# Patient Record
Sex: FEMALE | Race: BLACK OR AFRICAN AMERICAN
[De-identification: names, ages, dates, MRNs, and addresses within clinical notes are randomized per-mention and may not be internally consistent; named-entity substitution may affect disease eponyms.]

---

## 2017-07-19 NOTE — ER DOCUMENT REPORT
ED Skin Rash/Insect Bite/Abscs





- General


Chief Complaint: Abscess


Stated Complaint: ABSCESS ON BACK OF RIGHT HAND


Time Seen by Provider: 07/18/17 22:59


Mode of Arrival: Ambulatory


Information source: Patient


Notes: 


32-year-old female presents to ED for abscess to the left hand for 3-4 days.


TRAVEL OUTSIDE OF THE [place] IN LAST 30 DAYS: No


COUNTRY TRAVELED TO/FROM: [facility]


Patient complains to provider of: Tender/swollen area


Onset: Other - 3 To 4 days


Onset/Duration: Gradual


Quality of pain: Sharp, Stabbing


Severity: Moderate


Pain Level: 4


Skin Character: Abscess


Quality of rash: Painful


Identify cause: No


Exacerbated by: Movement


Relieved by: Denies


Similar symptoms previously: Yes


Recently seen / treated by doctor: No





- Related Data


Allergies/Adverse Reactions: 


 





Shellfish * [Shellfish] Allergy (Severe, Verified 11/24/16 12:56)


 Anaphylaxis


iodine [Iodine] Allergy (Unknown, Verified 11/24/16 12:56)


 Anaphylaxis











Past Medical History





- General


Information source: Patient





- Social History


Smoking Status: Current Every Day Smoker


Cigarette use (# per day): Yes - pack Per day


Chew tobacco use (# tins/day): No


Smoking Education Provided: Yes - Less than 2 minute


Frequency of alcohol use: None


Drug Abuse: None


Occupation: Health CNA


Lives with: Spouse/Significant other


Family History: Arthritis, CAD, DM, Hyperlipidemia, Hypertension, Malignancy, 

Thyroid Disfunction


Patient has suicidal ideation: No


Patient has homicidal ideation: No





- Past Medical History


Cardiac Medical History: Reports: Hx Hypertension


Pulmonary Medical History: Reports: Hx Asthma - Remote history, Hx COPD, Hx 

Pneumonia


Neurological Medical History: Reports: None


Endocrine Medical History: Reports: Hx Hypothyroidism


Renal/ Medical History: Reports: None


Malignancy Medical History: Reports: Hx Cervical Cancer, Hx Ovarian Cancer


GI Medical History: Reports: Hx Gastroesophageal Reflux Disease, Hx Irritable 

Bowel, Hx Colonoscopy, Hx Endoscopy


Musculoskeltal Medical History: Reports Hx Arthritis - CERVICAL/BACK DDD, 

Reports Hx Fibromyalgia, Reports Hx Musculoskeletal Deformity, Reports Hx 

Musculoskeletal Trauma


Skin Medical History: Reports Hx Cellulitis, Reports Hx MRSA


Psychiatric Medical History: Reports: Hx Anxiety, Hx Bipolar Disorder, Hx 

Depression, Hx Obsessive Compulsive Disorder


Traumatic Medical History: Reports: Hx Fractures - Foot nose ribs


Infectious Medical History: Reports: Hx MRSA


Past Surgical History: Reports: Hx Appendectomy, Hx Bowel Surgery - RESECTION, 

Hx Hysterectomy, Hx Orthopedic Surgery - Surgery on left foot 2, Hx Tubal 

Ligation





- Immunizations


Immunizations up to date: Yes


Hx Diphtheria, Pertussis, Tetanus Vaccination: No - THINKS SHE HAD IN 2006.





Review of Systems





- Review of Systems


Constitutional: No symptoms reported


EENT: No symptoms reported


Cardiovascular: No symptoms reported


Respiratory: No symptoms reported


Gastrointestinal: No symptoms reported


Genitourinary: No symptoms reported


Female Genitourinary: No symptoms reported


Musculoskeletal: No symptoms reported


Skin: Other - Left hand


Hematologic/Lymphatic: No symptoms reported


Neurological/Psychological: No symptoms reported





Physical Exam





- Vital signs


Vitals: 


 











Temp Pulse Resp BP Pulse Ox


 


 98.2 F   88   18   127/90 H  94 


 


 07/18/17 21:11  07/18/17 21:11  07/18/17 21:11  07/18/17 21:11  07/18/17 21:11











Interpretation: Normal





- General


General appearance: Appears well, Alert





- HEENT


Head: Normocephalic, Atraumatic


Eyes: Normal


Pupils: PERRL





- Respiratory


Respiratory status: No respiratory distress


Chest status: Nontender


Breath sounds: Normal


Chest palpation: Normal





- Cardiovascular


Rhythm: Regular


Heart sounds: Normal auscultation


Murmur: No





- Abdominal


Inspection: Normal


Distension: No distension


Bowel sounds: Normal


Tenderness: Nontender


Organomegaly: No organomegaly





- Back


Back: Normal, Nontender





- Extremities


General upper extremity: Normal inspection, Nontender, Normal color, Normal ROM

, Normal temperature


General lower extremity: Normal inspection, Nontender, Normal color, Normal ROM

, Normal temperature, Normal weight bearing.  No: Jaja's sign





- Neurological


Neuro grossly intact: Yes


Cognition: Normal


Orientation: AAOx4


[place] Coma Scale Eye Opening: Spontaneous


[place] Coma Scale Verbal: Oriented


[place] Coma Scale Motor: Obeys Commands


[place] Coma Scale Total: 15


Speech: Normal


Motor strength normal: LUE, RUE, LLE, RLE


Sensory: Normal





- Psychological


Associated symptoms: Normal affect, Normal mood





- Skin


Skin Temperature: Warm


Skin Moisture: Dry


Skin Color: Normal


Skin irregularity: Abscess - Hand left


Irregularity with: Swelling, Tenderness, Warmth





Course





- Vital Signs


Vital signs: 


 











Temp Pulse Resp BP Pulse Ox


 


 97.4 F   79   16   112/74   99 


 


 07/19/17 01:19  07/19/17 01:19  07/19/17 01:19  07/19/17 01:19  07/19/17 01:19














Procedures





- Incision and Drainage


  ** Left Hand


Type: Simple


Anesthetic type: 1% Lidocaine


mL's of anesthetic: 6


Blade size: 11


I&D procedure: Sterile dressing applied, Other - Equal scrub and saline


Incision Method: Incision made by scalpel


Amount/type of drainage: Large amount of purulent drainage





Discharge





- Discharge


Clinical Impression: 


 Abscess of right hand





Condition: Stable


Disposition: HOME, SELF-CARE


Additional Instructions: 


ABSCESS:





     You have an abscess (boil).  This a pus-forming infection, usually due to 

staph.  Some boils may be left to drain on their own, but most require lancing.


     From the time the tender lump first appears, it may be three or four days 

before the abscess is ready to bebeto.  Local heat and rest help at this stage 

of treatment.  An antibiotic may prevent spread of the infection.


     Once the abscess is opened, packing may be placed into it.  This is done 

so pus is not sealed inside by premature closure of the cavity. The packing 

will be removed at your follow-up visit or you may be advised to remove it 

yourself at home.  Sometimes this packing must be replaced a few times during 

healing.


     The wound will heal with surprisingly little scar.  Depending on the size 

and location of an abscess, healing can take one to four weeks.


     You may shower and wash the area around the incision site two or three 

times a day.


     Antibiotics may be prescribed, but are usually not necessary after an 

abscess has been drained.


     If you develop fever, chills, worsening pain, or increasing swelling in 

the area, call the doctor or return immediately.








POST INCISION AND DRAINAGE:


     You have had an incision made to allow drainage of an abscess. The 

incision must remain open so that pus and debris can drain from the wound.


     If the abscess cavity is large, packing is placed.  This keeps the tissues 

from collapsing and trapping pus inside, while the body shrinks the cavity.  

The packing may need to be replaced every day or two.  The physician will 

instruct you on the packing.


     Keep a bulky dressing over the area.  Replace it if it becomes saturated 

with blood or pus.  Do not disturb the packing (if present).


     You may shower and cleanse the area with gentle soap and warm water two or 

three times a day.  Local warmth may be soothing, and may promote faster 

healing.


     Return if you develop high fever or chills, or if you note spreading 

redness, increasing swelling, or increasing tenderness.





Epsom Salt Soaks





     Soak the wound area in a container of warm epsom salt water.  If you can't 

get the wound area into a bucket or pan, use a folded towel soaked in the epsom 

salt solution and apply to the area.


     Use clean hot tap water (about the temperature of a very warm bath), 

mixing in about one (1) teaspoon for every pint of water.


           Two gallon --> 16 teaspoons Epsom Salts


           One gallon -->  8 teaspoons Epsom Salts


           Two quarts -->  4 teaspoons Epsom Salts


           One quart  -->  2 teaspoons Epsom Salts


     Soak the wound for about 20 minutes while gently moving it around in the 

water.  Repeat this four (4) times a day.





ORAL NARCOTIC MEDICATION:


     You have been given a norco disp pack for pain control.  This medication 

is a narcotic.  It's best taken with food, as nausea can result if taken on an 

empty stomach.


     Don't operate machinery or drive within six hours of taking this 

medication.  Do not combine this medicine with alcohol, or with any medication 

which can cause sedation (such as cold tablets or sleeping pills) unless you 

get permission from the physician.


     Narcotics tend to cause constipation.  If possible, drink plenty of fluids 

and eat a diet high in fiber and fruits.








CEPHALEXIN:


     The antibiotic you've been prescribed is a member of the cephalosporin 

class.  This type of antibiotic covers a wide variety of infections, including 

those of the skin, lungs, and urinary tract. It's useful for staph infections.


     This antibiotic is slightly similar to the penicillin family. In rare cases

, a person who is allergic to penicillin will also be allergic to this 

medication.  If you have had a severe allergic reaction to penicillin, and have 

not taken this antibiotic since that time, notify your doctor.


     Antibiotics which cover many germs ("broad spectrum" antibiotics) are more 

likely to cause diarrhea or "yeast" infections.  Women prone to vaginal yeast 

problems may suffer an attack after taking this antibiotic.  In infants, oral 

thrush (white spots "stuck" on the cheek) or yeast diaper rash may result.  See 

your doctor if these problems occur.  Call at once if you develop itching, hives

, shortness of breath, or lightheadedness.








TRIMETHOPRIM-SULFA:


     You have been given a prescription for trimethoprim-sulfa (TMS, Septra, 

Bactrim).  This is a combination antibiotic of the sulfa class, often used for 

urinary tract infections, middle ear infections, bronchitis, shigella 

intestinal infection, and Pneumocystis pneumonia.


     TMS is usually well-tolerated.  Occasional side effects include nausea and 

decreased appetite.  Septra is not recommended for infants less than two months 

of age.  Do not take this medication if you have experienced severe side 

effects or allergy to sulfa medicine.


     You should stop this medicine at once and contact your physician if you 

develop any rash, joint pain, shortness of breath, bruising, or jaundice (

yellow color in the skin), or if you develop any other new or unusual symptoms.





FOLLOW-UP CARE:


Most simple abscesses will not require a follow up visit.   If you had packing 

placed in the abscess, remove it as instructed by the physician.  If you have 

been referred to a physician for follow-up care, call the physicians office 

for an appointment as you were instructed or within the next two days.  If you 

experience worsening or a significant change in your symptoms, return to the 

Emergency Department at any time for re-evaluation.





Prescriptions: 


Cephalexin Monohydrate [Keflex 500 mg Capsule] 500 mg PO QID #20 capsule


Sulfamethoxazole/Trimethoprim [Septra-Ds 800-160 mg Tablet] 1 tab PO BID #20 

tablet


Forms:  Elevated Blood Pressure, Smoking Cessation Education, Return to Work


Referrals: 


DEWEY CLEMENTE MD [Primary Care Provider] - Follow up in 3-5 days

## 2018-02-24 NOTE — RADIOLOGY REPORT (SQ)
EXAM DESCRIPTION:  SHOULDER RIGHT 2 OR MORE VIEWS



COMPLETED DATE/TIME:  2/24/2018 11:21 am



REASON FOR STUDY:  R shoulder pain



COMPARISON:  12/29/2015



NUMBER OF VIEWS:  Three views.



TECHNIQUE:  Internal rotation, external rotation, and Y view images acquired of the right shoulder.



LIMITATIONS:  None.



FINDINGS:  MINERALIZATION: Normal.

BONES: There is a lucency in the inferior aspect of the glenoid that was not definitively seen on nic
or exam.  This may represent sequela from old injury or possibly a new injury.

JOINTS: No dislocation.

VISUALIZED LUNGS AND RIBS: No pneumothorax.  No rib fracture.

SOFT TISSUES: No radiopaque foreign body.

OTHER: No other significant finding.



IMPRESSION:  Lucency along the inferior aspect of the glenoid.  This may be sequela from prior injury
, however an knees bony Bankart lesion is not entirely excluded.



TECHNICAL DOCUMENTATION:  JOB ID:  [id number]

 2011 [employer]- All Rights Reserved



Reading location - IP/workstation name: [email or web address]

## 2018-02-24 NOTE — ER DOCUMENT REPORT
ED Medical Screen (RME)





- General


Chief Complaint: Arm Pain


Stated Complaint: ARM PAIN, POSSIBLE ABSCESS


Time Seen by Provider: 02/24/18 10:15


Mode of Arrival: Ambulatory


Information source: Patient


TRAVEL OUTSIDE OF THE [place] IN LAST 30 DAYS: No


COUNTRY TRAVELED TO/FROM: [facility]


Patient complains to provider of: R arm pain


Onset: Other - Pt. noticed red, swollen area on lateral aspect of her R 

shoulder area yesterday.  Denies fever





- Related Data


Allergies/Adverse Reactions: 


 





Shellfish * [Shellfish] Allergy (Severe, Verified 11/24/16 12:56)


 Anaphylaxis


iodine [Iodine] Allergy (Unknown, Verified 11/24/16 12:56)


 Anaphylaxis











Past Medical History





- Past Medical History


Cardiac Medical History: Reports: Hx Hypertension


   Denies: Hx Coronary Artery Disease, Hx Heart Attack


Pulmonary Medical History: Reports: Hx Asthma - Remote history, Hx COPD, Hx 

Pneumonia


   Denies: Hx Bronchitis


Neurological Medical History: Denies: Hx Cerebrovascular Accident, Hx Seizures


Endocrine Medical History: Reports: Hx Hypothyroidism.  Denies: Hx Graves' 

Disease, Hx Hyperthyroidism


Renal/ Medical History: Denies: Hx Peritoneal Dialysis


Malignancy Medical History: Reports: Hx Cervical Cancer, Hx Ovarian Cancer


GI Medical History: Reports: Hx Gastroesophageal Reflux Disease, Hx Irritable 

Bowel, Hx Colonoscopy, Hx Endoscopy.  Denies: Hx Diverticulitis - Diverticulosis


Musculoskeltal Medical History: Reports Hx Arthritis - CERVICAL/BACK DDD, 

Reports Hx Fibromyalgia, Reports Hx Musculoskeletal Deformity, Reports Hx 

Musculoskeletal Trauma


Skin Medical History: Reports Hx Cellulitis, Reports Hx MRSA


Psychiatric Medical History: Reports: Hx Anxiety, Hx Bipolar Disorder, Hx 

Depression, Hx Obsessive Compulsive Disorder


Traumatic Medical History: Reports: Hx Fractures - Foot nose ribs


Infectious Medical History: Reports: Hx MRSA


Past Surgical History: Reports: Hx Appendectomy, Hx Bowel Surgery - RESECTION, 

Hx Hysterectomy, Hx Orthopedic Surgery - Surgery on left foot 2, Hx Tubal 

Ligation





- Immunizations


Immunizations up to date: Yes


Hx Diphtheria, Pertussis, Tetanus Vaccination: No - THINKS SHE HAD IN 2006.





Physical Exam





- Vital signs


Vitals: 





 











Temp Pulse Resp BP Pulse Ox


 


 98.3 F   92   16   128/74 H  99 


 


 02/24/18 10:11  02/24/18 10:11  02/24/18 10:11  02/24/18 10:11  02/24/18 10:11














Course





- Vital Signs


Vital signs: 





 











Temp Pulse Resp BP Pulse Ox


 


 98.3 F   92   16   128/74 H  99 


 


 02/24/18 10:11  02/24/18 10:11  02/24/18 10:11  02/24/18 10:11  02/24/18 10:11
ED Skin Rash/Insect Bite/Abscs





- General


Chief Complaint: Arm Pain


Stated Complaint: ARM PAIN, POSSIBLE ABSCESS


Time Seen by Provider: 02/24/18 10:15


Mode of Arrival: Ambulatory


Information source: Patient


Notes: 





Patient is a 52-year-old female who presents today for a "spider bite" to her 

right lateral shoulder starting around 4 days ago.  She denies any fevers.  She 

does states she vomited 2 2 days ago but has not vomited since that time.  

Patient states she has had an abscess 2 in the past.  She denies being 

diabetic.  She denies any IV drug use.





On further discussion with the patient, she states she has had seizures for the 

last 2 years.  She states she was started on a medicine in [place].  She 

believes it was Seroquel.  She was also given a diagnosis recently of bipolar 

disorder.  Patient states she takes no medications.  Patient states she has a 

seizure-like episode around 1 time per month.  Family in the room have 

witnessed these episodes and it appears that the patient does have some tonic-

clonic jerking.  Patient does have a primary care physician that she states she 

has seen around 8 months ago but did not mention to him the seizure-like 

episodes.  Patient currently denies any headache, chest pain, weakness or 

numbness.  Patient understands that she should not drive a car.  Patient denies 

any auditory or visual hallucinations, racing thoughts, or difficulty sleeping.


TRAVEL OUTSIDE OF THE [place] IN LAST 30 DAYS: No


COUNTRY TRAVELED TO/FROM: [facility]


Patient complains to provider of: Tender/swollen area


Onset: Other - See above


Onset/Duration: Gradual


Quality of pain: Achy


Severity: Mild


Pain Level: 2


Skin Character: Abscess


Skin Temperature: Warm


Quality of rash: Painful


Identify cause: No


Exacerbated by: Movement


Relieved by: Denies


Similar symptoms previously: Yes


Recently seen / treated by doctor: No





- Related Data


Allergies/Adverse Reactions: 


 





Shellfish * [Shellfish] Allergy (Severe, Verified 02/24/18 10:17)


 Anaphylaxis


iodine [Iodine] Allergy (Unknown, Verified 02/24/18 10:17)


 Anaphylaxis











Past Medical History





- General


Information source: Patient





- Social History


Smoking Status: Current Every Day Smoker


Cigarette use (# per day): No


Chew tobacco use (# tins/day): No


Smoking Education Provided: No


Frequency of alcohol use: None


Family History: Arthritis, CAD, DM, Hyperlipidemia, Hypertension, Malignancy, 

Thyroid Disfunction


Patient has suicidal ideation: No


Patient has homicidal ideation: No





- Past Medical History


Cardiac Medical History: Reports: Hx Hypertension


   Denies: Hx Coronary Artery Disease, Hx Heart Attack


Pulmonary Medical History: Reports: Hx Asthma - Remote history, Hx COPD, Hx 

Pneumonia


   Denies: Hx Bronchitis


Neurological Medical History: Denies: Hx Cerebrovascular Accident, Hx Seizures


Endocrine Medical History: Reports: Hx Hypothyroidism.  Denies: Hx Graves' 

Disease, Hx Hyperthyroidism


Renal/ Medical History: Denies: Hx Peritoneal Dialysis


Malignancy Medical History: Reports: Hx Cervical Cancer, Hx Ovarian Cancer


GI Medical History: Reports: Hx Gastroesophageal Reflux Disease, Hx Irritable 

Bowel, Hx Colonoscopy, Hx Endoscopy.  Denies: Hx Diverticulitis - Diverticulosis


Musculoskeltal Medical History: Reports Hx Arthritis - CERVICAL/BACK DDD, 

Reports Hx Fibromyalgia, Reports Hx Musculoskeletal Deformity, Reports Hx 

Musculoskeletal Trauma


Skin Medical History: Reports Hx Cellulitis, Reports Hx MRSA


Psychiatric Medical History: Reports: Hx Anxiety, Hx Bipolar Disorder, Hx 

Depression, Hx Obsessive Compulsive Disorder


Traumatic Medical History: Reports: Hx Fractures - Foot nose ribs


Infectious Medical History: Reports: Hx MRSA


Past Surgical History: Reports: Hx Appendectomy, Hx Bowel Surgery - RESECTION, 

Hx Hysterectomy, Hx Orthopedic Surgery - Surgery on left foot 2, Hx Tubal 

Ligation





- Immunizations


Immunizations up to date: Yes


Hx Diphtheria, Pertussis, Tetanus Vaccination: No - THINKS SHE HAD IN 2006.





Review of Systems





- Review of Systems


Constitutional: denies: Fever


EENT: denies: Eye discharge, Nose discharge


Cardiovascular: denies: Chest pain, Palpitations


Respiratory: denies: Short of breath


Gastrointestinal: Vomiting


Genitourinary: denies: Dysuria


Musculoskeletal: denies: Joint swelling, Muscle pain


Neurological/Psychological: Other - no slurred speech


-: Yes All other systems reviewed and negative





Physical Exam





- Vital signs


Vitals: 


 











Temp Pulse Resp BP Pulse Ox


 


 98.3 F   92   16   128/74 H  99 


 


 02/24/18 10:11  02/24/18 10:11  02/24/18 10:11  02/24/18 10:11  02/24/18 10:11











Notes: 





Reviewed vital signs and nursing note as charted by RN.





CONSTITUTIONAL: Alert and oriented and responds appropriately to questions. Well

-appearing; well-nourished





HEAD: Normocephalic; atraumatic





EYES: PERRL; Sclerae non-icteric





ENT: Normal nose; no rhinorrhea; moist mucous membranes





NECK: Supple without meningismus; non-tender





CARD: Regular rate and rhythm; no murmurs





RESP: Normal chest excursion without splinting or tachypnea; breath sounds 

clear and equal bilaterally





ABD/GI: Normal bowel sounds; non-distended; soft, non-tender





BACK: The back appears normal and is non-tender to palpation





EXT: Patient has some tenderness, erythema, and fluctuance to the lateral right 

deltoid.  It does not appear to extend anteriorly or posteriorly.  Full passive 

range of motion.  Neurovascularly intact distally





SKIN: See above





NEURO: See above





PSYCH: The patient's mood and manner are appropriate. Grooming and personal 

hygiene are appropriate.





Course





- Re-evaluation


Re-evalutation: 





02/24/18 12:02


Given the history and physical examination, we will x-ray the right shoulder, 

obtain basic labs, liver panel, and most likely set up for an I&D.  I do not 

believe that the infection is intra-articular at this time.  Given that the 

patient may be placed on seizure medications in the near future, we will assess 

the liver panel.  Given what appears to be an abscess with some surrounding 

cellulitis, I will  provide both Keflex and Bactrim.





02/24/18 12:17


X-ray as recorded.  White count is recorded.  I spoke to the radiologist read 

the x-ray of the shoulder and explained the full history and physical.  He said 

there is no lucency or remodeling consistent with an osteomyelitis.  I will 

proceed to an incision and drainage.





02/24/18 12:50


Labs as recorded.  I performed a bedside incision and drainage and have packed 

with 1/4 inch iodoform packing.  Antibiotics have been provided.  Patient has 

been encouraged to follow-up with the primary care physician as well as 

possibly the orthopedic surgeon for reassessment.  Strict return precautions 

have been explained.  Patient also has been notified not to drive a car, take a 

bath, go swimming, or engage in any other activities that may cause serious 

harm or death if she should have a repeat seizure-like episode.





- Vital Signs


Vital signs: 


 











Temp Pulse Resp BP Pulse Ox


 


 98.3 F   92   16   128/74 H  99 


 


 02/24/18 10:11  02/24/18 10:11  02/24/18 10:11  02/24/18 10:11  02/24/18 10:11














- Laboratory


Result Diagrams: 


 02/24/18 10:45





 02/24/18 10:45


Laboratory results interpreted by me: 


 











  02/24/18 02/24/18





  10:45 10:45


 


WBC  17.1 H 


 


MCH  25.7 L 


 


RDW  14.3 H 


 


Seg Neutrophils %  80.8 H 


 


Lymphocytes %  12.9 L 


 


Absolute Neutrophils  13.8 H 


 


Glucose   118 H














Procedures





- Incision and Drainage


  ** Right Shoulder


Type: Simple, Single


Anesthetic type: 1% Lidocaine w/epi


mL's of anesthetic: 8


Blade size: 11


I&D procedure: Chlorprep applied, Iodoform packing placed


Incision Method: Incision made by scalpel


Amount/type of drainage: 7ml





Discharge





- Discharge


Clinical Impression: 


 Abscess of right shoulder, Seizure-like activity





Condition: Good


Disposition: HOME, SELF-CARE


Instructions:  Oral Narcotic Medication ([facility]), Abscess ([facility]), Cephalexin ([facility]), 

MRSA Cellulitis ([facility]), Post Incision and Drainage, Trimethoprim-Sulfa ([facility])


Additional Instructions: 


Come back immediately for any increased redness, swelling, pain, fever, 

weakness or numbness, or any other acute problems.  Please follow-up within 24 

hours with either your primary care physician, here in the emergency department

, or with the orthopedic doctor that we have referred you to for reassessment 

of your wound.  





Please do not to drive a car, take a bath, go swimming, or engage in any other 

activities that may cause serious harm or death if you should have a repeat 

seizure-like episode and please follow up with your primary care physician.


Prescriptions: 


Cephalexin Monohydrate [Keflex 500 mg Capsule] 500 mg PO TID 5 Days #30 capsule


Hydrocodone/Acetaminophen [Norco 5-325 Tablet] 1 each PO Q6 PRN #12 tablet


 PRN Reason: For Pain


Sulfamethoxazole/Trimethoprim [Bactrim Ds Tablet] 1 each PO BID #20 tablet


Referrals: 


DEWEY CLEMENTE MD [Primary Care Provider] - Follow up as needed
59

## 2018-05-10 NOTE — ER DOCUMENT REPORT
ED Substance Abuse / Acc. OD





- General


Chief Complaint: Drug Abuse


Stated Complaint: ALTERED MENTAL STATUS


Time Seen by Provider: 05/10/18 20:54


Notes: 


Patient is a 53-year-old female, daily 1 g twice daily heroin user, presents by 

EMS because please call requesting help.  She was given the ultimatum to go the 

ER or to detention.  Patient had no respiratory distress and no complaints.  She was 

given Narcan by EMS to take home, but did not receive administration of Narcan 

today.  Denies SI or HI.


TRAVEL OUTSIDE OF THE [place] IN LAST 30 DAYS: No


COUNTRY TRAVELED TO/FROM: [place]





- Related Data


Allergies/Adverse Reactions: 


 





Shellfish * [Shellfish] Allergy (Severe, Verified 02/24/18 10:17)


 Anaphylaxis


iodine [Iodine] Allergy (Unknown, Verified 02/24/18 10:17)


 Anaphylaxis











Past Medical History





- General


Information source: Patient





- Social History


Smoking Status: Current Every Day Smoker


Drug Abuse: Heroin


Family History: Arthritis, CAD, DM, Hyperlipidemia, Hypertension, Malignancy, 

Thyroid Disfunction





- Past Medical History


Cardiac Medical History: Reports: Hx Hypertension


   Denies: Hx Coronary Artery Disease, Hx Heart Attack


Pulmonary Medical History: Reports: Hx Asthma - Remote history, Hx COPD, Hx 

Pneumonia


   Denies: Hx Bronchitis


Neurological Medical History: Denies: Hx Cerebrovascular Accident, Hx Seizures


Endocrine Medical History: Reports: Hx Hypothyroidism.  Denies: Hx Graves' 

Disease, Hx Hyperthyroidism


Renal/ Medical History: Denies: Hx Peritoneal Dialysis


Malignancy Medical History: Reports: Hx Cervical Cancer, Hx Ovarian Cancer


GI Medical History: Reports: Hx Gastroesophageal Reflux Disease, Hx Irritable 

Bowel, Hx Colonoscopy, Hx Endoscopy.  Denies: Hx Diverticulitis - Diverticulosis


Musculoskeltal Medical History: Reports Hx Arthritis - CERVICAL/BACK DDD, 

Reports Hx Fibromyalgia, Reports Hx Musculoskeletal Deformity, Reports Hx 

Musculoskeletal Trauma


Skin Medical History: Reports Hx Cellulitis, Reports Hx MRSA


Psychiatric Medical History: Reports: Hx Anxiety, Hx Bipolar Disorder, Hx 

Depression, Hx Obsessive Compulsive Disorder


Traumatic Medical History: Reports: Hx Fractures - Foot nose ribs


Infectious Medical History: Reports: Hx MRSA


Past Surgical History: Reports: Hx Appendectomy, Hx Bowel Surgery - RESECTION, 

Hx Hysterectomy, Hx Orthopedic Surgery - Surgery on left foot 2, Hx Tubal 

Ligation





- Immunizations


Immunizations up to date: Yes


Hx Diphtheria, Pertussis, Tetanus Vaccination: No - THINKS SHE HAD IN 2006.





Review of Systems





- Review of Systems


Notes: 


REVIEW OF SYSTEMS:


CONSTITUTIONAL: -fevers, -chills


EENT: -eye pain, -difficulty swallowing, -nasal congestion


CARDIOVASCULAR: -chest pain, -syncope.


RESPIRATORY: -cough, -SOB


GASTROINTESTINAL: -abdominal pain, -nausea, -vomiting, -diarrhea


GENITOURINARY: -dysuria, -hematuria


MUSCULOSKELETAL: -back pain, -neck pain


SKIN: -rash or skin lesions.


HEMATOLOGIC: -easy bruising or bleeding.


LYMPHATIC: -swollen, enlarged glands.


NEUROLOGICAL: -altered mental status or loss of consciousness, -headache, -

neurologic symptoms


PSYCHIATRIC: -anxiety, -depression, +drug abuse


ALL OTHER SYSTEMS REVIEWED AND NEGATIVE.





Physical Exam





- Notes


Notes: 


PHYSICAL EXAMINATION:


GENERAL: Well-appearing, well-nourished and in no acute distress.


HEAD: Atraumatic, normocephalic.


EYES: Pupils equal round and reactive to light, extraocular movements intact, 

sclera anicteric, conjunctiva are normal.


ENT: nares patent, oropharynx clear without exudates.  Moist mucous membranes.


NECK: Normal range of motion, supple without lymphadenopathy


LUNGS: Breath sounds clear to auscultation bilaterally and equal.  No wheezes 

rales or rhonchi.


HEART: Regular rate and rhythm without murmurs


ABDOMEN: Soft, nontender, normoactive bowel sounds.  No guarding, no rebound.  

No masses appreciated.


EXTREMITIES: Normal range of motion, no pitting or edema.  No cyanosis.


NEUROLOGICAL: Cranial nerves grossly intact.  Normal speech, normal gait.  

Normal sensory and motor exams.


PSYCH: Normal mood, normal affect.


SKIN: Warm, Dry, normal turgor, no rashes or lesions noted.





Course





- Re-evaluation


Re-evalutation: 


Patient appears well and is in no respiratory distress.  She is only in the ER 

because her other option was to go to detention.  EMS gave her Narcan to use at 

home.  Provided her with outpatient detox resources.





Discharge





- Discharge


Clinical Impression: 


 Heroin abuse





Condition: Stable


Disposition: HOME, SELF-CARE


Additional Instructions: 


When you are ready to stop using heroin, call the detox centers.





NARCOTIC / OPIOD ABUSE:





     Narcotics and opiods are pain-relieving drugs that are often abused.  They 

are addicting.  Narcotics cause euphoria, but it often takes increasing amounts 

to "feel good" and avoid withdrawal symptoms.


     Overdose of narcotics causes small pupils, coma, and decreased breathing.  

It's a common cause of death.  Purity of street narcotics is unpredictable.  

Injection of narcotics is risky for abscesses, endocarditis (heart infection), 

pneumonia, and AIDS.


     Withdrawal from narcotics causes goose bumps, watery mouth, sweating, 

nasal congestion, muscle aches, abdominal cramps, vomiting, and diarrhea.  There

's often restlessness and confusion.


     Treatment programs are available, but you must make the decision to quit.  

Medication (such as clonidine) can be prescribed to control the symptoms of 

withdrawal.








INSTRUCTIONS FOR HOME CARE FOLLOWING DRUG OVERDOSAGE:


     The doctor feels it's safe for you to go home.  You will need to be 

observed.  If charcoal and a laxative was given to you, expect some loose black 

stools soon.


     Take no medications unless approved by a physician, including alcohol.  If 

drowsy, lie on your stomach or side for sleeping to avoid aspiration if 

vomiting occurs.  Take only liquids by mouth until there is no more nausea.  





     FOR THE OBSERVER: 


Observe the patient for the next 24 hours and call or go to the hospital if any 

of the following are noted: prolonged or repeated vomiting, difficulty in 

arousing, convulsions (seizures or fits), fever, persistent cough, breathing 

that is too slow or too rapid, or confused or bizarre behavior.


     If a counselling visit has been arranged, make sure the patient attends.  

Call the physician or poison control if you have questions.








FOLLOW-UP CARE:


If you have been referred to a physician for follow-up care, call the physician

s office for an appointment as you were instructed or within the next two days.

  If you experience worsening or a significant change in your symptoms, notify 

the physician immediately or return to the Emergency Department at any time for 

re-evaluation.


Referrals: 


[facility] [Outside] - Follow up as needed

## 2018-05-16 NOTE — RADIOLOGY REPORT (SQ)
EXAM DESCRIPTION:  CT ABD/PELVIS NO ORAL OR IV



COMPLETED DATE/TIME:  5/16/2018 12:50 pm



REASON FOR STUDY:  abd pain, llq and back pain



COMPARISON:  10/24/2016



TECHNIQUE:  CT scan of the abdomen and pelvis performed without intravenous or oral contrast. Images 
reviewed with lung, soft tissue, and bone windows. Reconstructed coronal and sagittal MPR images revi
ewed. All images stored on PACS.

All CT scanners at this facility use dose modulation, iterative reconstruction, and/or weight based d
osing when appropriate to reduce radiation dose to as low as reasonably achievable (ALARA).

[facility]: Dose Right  [facility]: CareDose    [facility]: Dose Right    [facility]: Teradose 4D    [facility]: Smart Dishable



RADIATION DOSE:  CT Rad equipment meets quality standard of care and radiation dose reduction techniq
ues were employed. CTDIvol: 7.1 mGy. DLP: 378 mGy-cm.mGy.



LIMITATIONS:  None.



FINDINGS:  LOWER CHEST: No significant findings. No nodules or infiltrates.

NON-CONTRASTED LIVER, SPLEEN, ADRENALS: Evaluation limited by lack of IV contrast. No identified sign
ificant masses.

PANCREAS: No masses. No peripancreatic inflammatory changes.

GALLBLADDER: No identified stones by CT criteria. No inflammatory changes to suggest cholecystitis.

RIGHT KIDNEY AND URETER: No hydronephrosis of the right kidney.   No renal stones.   However, a new 5
 mm calcification is seen in the upper right anatomic pelvis which cannot be clearly  from t
he distal right ureter.  Correlate clinically.  Nonobstructing stone of concern.

LEFT KIDNEY AND URETER: No suspicious masses. Assessment limited by lack of IV contrast.   No signifi
cant calcifications.   No hydronephrosis or hydroureter.

AORTA AND RETROPERITONEUM: No aneurysm. No retroperitoneal masses or adenopathy.

BOWEL AND PERITONEAL CAVITY: Constant trace she with increase air and fecal material distending the r
ectum.  Nonobstructive bowel appearance.  No obvious diverticular change.

APPENDIX: Surgically absent by history

PELVIS, BLADDER, AND ABDOMINAL WALL:No abnormal masses. No free fluid. Bladder normal.  Uterus surgic
ally absent.

BONES: Schmorl's node deformity L1 stable.

OTHER: No other significant finding.



IMPRESSION:  Constipation.  Fecal material distends the rectum

No stone or hydronephrosis within either kidney.  .  However, there is a new 5 mm calcification in th
e upper anatomic pelvis on the right which cannot be clearly  from the nondilated right uret
er.  Correlate clinically.



COMMENT:  Quality ID # [id number]: Final reports with documentation of one or more dose reduction techniques
 (e.g., Automated exposure control, adjustment of the mA and/or kV according to patient size, use of 
iterative reconstruction technique)



TECHNICAL DOCUMENTATION:  JOB ID:  [id number]

 2011 [employer]- All Rights Reserved



Reading location - IP/workstation name: [email or web address]

## 2018-05-16 NOTE — ER DOCUMENT REPORT
ED GI/





- General


Chief Complaint: Nausea/Vomiting/Diarrhea


Stated Complaint: NAUSEA,VOMITING


Time Seen by Provider: 05/16/18 10:46


Mode of Arrival: Medic


Information source: Patient


Notes: 





Patient is a 53-year-old female, heroin user twice daily who presents to the ER 

today for back pain in the low back, lower abdominal pain, abscess to the left 

arm.  Patient states that she "knows that these pains are mostly withdrawal 

pains" as she has not used heroin in 5 days.  Then she states that she is using 

small amounts of heroin at a time, so I am unsure as to whether she is actually 

not used heroin 5 days or if she is just using less.  Patient states that she 

wants detox.  She denies any fevers or chills.  She does have a history of 

kidney stones but cannot localize to me where the back and abdominal pain is.  

She just states that it hurts "all over."  She denies any dysuria, hematuria, 

abnormal vaginal discharge.


TRAVEL OUTSIDE OF THE [place] IN LAST 30 DAYS: No


COUNTRY TRAVELED TO/FROM: [place]





- Related Data


Allergies/Adverse Reactions: 


 





Shellfish * [Shellfish] Allergy (Severe, Verified 05/10/18 21:02)


 Anaphylaxis


iodine [Iodine] Allergy (Unknown, Verified 05/10/18 21:02)


 Anaphylaxis











Past Medical History





- General


Information source: Patient





- Social History


Smoking Status: Current Every Day Smoker


Chew tobacco use (# tins/day): No


Frequency of alcohol use: None


Drug Abuse: None


Family History: Arthritis, CAD, DM, Hyperlipidemia, Hypertension, Malignancy, 

Thyroid Disfunction


Patient has suicidal ideation: No


Patient has homicidal ideation: No





- Past Medical History


Cardiac Medical History: Reports: Hx Hypertension


   Denies: Hx Coronary Artery Disease, Hx Heart Attack


Pulmonary Medical History: Reports: Hx Asthma - Remote history, Hx COPD, Hx 

Pneumonia


   Denies: Hx Bronchitis


Neurological Medical History: Denies: Hx Cerebrovascular Accident, Hx Seizures


Endocrine Medical History: Reports: Hx Hypothyroidism.  Denies: Hx Graves' 

Disease, Hx Hyperthyroidism


Renal/ Medical History: Denies: Hx Peritoneal Dialysis


Malignancy Medical History: Reports: Hx Cervical Cancer, Hx Ovarian Cancer


GI Medical History: Reports: Hx Gastroesophageal Reflux Disease, Hx Irritable 

Bowel, Hx Colonoscopy, Hx Endoscopy.  Denies: Hx Diverticulitis - Diverticulosis


Musculoskeltal Medical History: Reports Hx Arthritis - CERVICAL/BACK DDD, 

Reports Hx Fibromyalgia, Reports Hx Musculoskeletal Deformity, Reports Hx 

Musculoskeletal Trauma


Skin Medical History: Reports Hx Cellulitis, Reports Hx MRSA


Psychiatric Medical History: Reports: Hx Anxiety, Hx Bipolar Disorder, Hx 

Depression, Hx Obsessive Compulsive Disorder


Traumatic Medical History: Reports: Hx Fractures - Foot nose ribs


Infectious Medical History: Reports: Hx MRSA


Past Surgical History: Reports: Hx Appendectomy, Hx Bowel Surgery - RESECTION, 

Hx Hysterectomy, Hx Orthopedic Surgery - Surgery on left foot 2, Hx Tubal 

Ligation





- Immunizations


Immunizations up to date: Yes


Hx Diphtheria, Pertussis, Tetanus Vaccination: No - THINKS SHE HAD IN 2006.





Review of Systems





- Review of Systems


Constitutional: No symptoms reported


EENT: No symptoms reported


Cardiovascular: No symptoms reported


Respiratory: No symptoms reported


Gastrointestinal: See HPI


Genitourinary: No symptoms reported


Female Genitourinary: No symptoms reported


Musculoskeletal: No symptoms reported


Skin: See HPI


Hematologic/Lymphatic: No symptoms reported


Neurological/Psychological: No symptoms reported





Physical Exam





- Vital signs


Vitals: 


 











Temp Pulse Resp BP Pulse Ox


 


 98.1 F   65   18   119/70   100 


 


 05/16/18 10:37  05/16/18 10:37  05/16/18 10:37  05/16/18 10:37  05/16/18 10:37














- Notes


Notes: 





PHYSICAL EXAMINATION: 


GENERAL: Chronically ill-appearing, but in no acute distress. 


HEAD: Atraumatic, normocephalic. 


EYES: Pupils equal round and reactive to light, extraocular movements intact, 

sclera anicteric, conjunctiva are normal. 


NECK: Normal range of motion, supple without lymphadenopathy 


LUNGS: CTAB and equal. No wheezes rales or rhonchi. 


HEART: Regular rate and rhythm without murmurs


ABDOMEN: Soft, moderate diffuse tenderness, no localization. No guarding, no 

rebound 


BACK: Exquisite thoracic and lumbar vertebral tenderness to extremely light 

touch, normal ROM


GI/: no CVA tenderness


EXTREMITIES: Normal range of motion, no pitting edema. No cyanosis. 


NEUROLOGICAL: Cranial nerves grossly intact. Normal sensory/motor exams. 


PSYCH: Normal mood, normal affect. 


SKIN: Warm, Dry, normal turgor, 2 cm in diameter fluctuant abscess to the left 

arm, AC, erythema surrounding by approximately 2 cm

















Course





- Re-evaluation


Re-evalutation: 





05/16/18 14:07


abscess drained successfully, copious amount of pus drained, pt given 

vancomycin for AC abscess and ativan for heroin withdrawals 


05/16/18 17:15


White blood cell count is mildly elevated, possible kidney stone, 5 cm seen in 

the right ureter on CAT scan today, constipation, no other acute pathology on 

CAT scan today.  Patient continues to ask for Dilaudid.  I will not provide her 

with any narcotic pain medication.  She did receive Toradol, Ativan and IV 

fluids and states she is no better.  She continues to state "I know this is 

just withdrawal pain."  She continues to ask for detox.  I will provide her 

with resources for detox and I did tell her that we do not do that here.  

Patient be placed on doxycycline for her abscess, skin marker was used to kristie 

the erythema to the left AC and she is to return with any worsening of the 

redness outside of the skin marker.  Patient does understand this and agree.





- Vital Signs


Vital signs: 


 











Temp Pulse Resp BP Pulse Ox


 


 98.1 F   65   18   119/70   100 


 


 05/16/18 10:37  05/16/18 10:37  05/16/18 10:37  05/16/18 10:37  05/16/18 10:37














- Laboratory


Result Diagrams: 


 05/16/18 12:32





 05/16/18 12:32


Laboratory results interpreted by me: 


 











  05/16/18 05/16/18





  12:32 12:32


 


WBC  13.9 H 


 


RBC  5.77 H 


 


MCV  78 L 


 


MCH  25.4 L 


 


RDW  14.4 H 


 


Seg Neutrophils %  85.4 H 


 


Lymphocytes %  10.2 L 


 


Absolute Neutrophils  11.9 H 


 


Sodium   148.5 H


 


Chloride   109 H


 


Carbon Dioxide   21 L


 


BUN   21 H


 


Direct Bilirubin   0.5 H














Procedures





- Incision and Drainage


  ** Left Mid- Arm


Time completed: 14:00


Type: Simple


Anesthetic type: 1% Lidocaine


mL's of anesthetic: 4


Blade size: 11


I&D procedure: Shurclens applied


Incision Method: Incision made by scalpel


Amount/type of drainage: copious pus





Discharge





- Discharge


Clinical Impression: 


 Heroin abuse, Abscess of arm, left





Constipation


Qualifiers:


 Constipation type: drug induced constipation Qualified Code(s): K59.03 - Drug 

induced constipation





Condition: Stable


Disposition: HOME, SELF-CARE


Additional Instructions: 


Return immediately for any new or worsening symptoms.





Follow up with primary care provider, call tomorrow to make followup 

appointment.





I have given you the information for the crisis hotline, call them, the number 

I have circled on the paper we gave you and let them know that he would like to 

detox from heroin.  There are able to set up detox for you.  The hospital does 

not do drug detox or alcohol detox.


Prescriptions: 


Doxycycline Hyclate 100 mg PO BID #20 capsule


Referrals: 


DEWEY CLEMENTE MD [Primary Care Provider] - Follow up as needed

## 2018-06-01 NOTE — RADIOLOGY REPORT (SQ)
EXAM DESCRIPTION:  CHEST SINGLE VIEW



COMPLETED DATE/TIME:  6/1/2018 6:35 pm



REASON FOR STUDY:  pneumonia



COMPARISON:  11/25/2016



EXAM PARAMETERS:  NUMBER OF VIEWS: One view.

TECHNIQUE: Single frontal radiographic view of the chest acquired.

RADIATION DOSE: NA

LIMITATIONS: None.



FINDINGS:  LUNGS AND PLEURA: No opacities, masses or pneumothorax. No pleural effusion.

MEDIASTINUM AND HILAR STRUCTURES: No masses.  Contour normal.

HEART AND VASCULAR STRUCTURES: Heart normal in size.  Normal vasculature.

BONES: No acute findings.

HARDWARE: None in the chest.

OTHER: No other significant finding.



IMPRESSION:  NO ACUTE RADIOGRAPHIC FINDING IN THE CHEST.



TECHNICAL DOCUMENTATION:  JOB ID:  [id number]

 2011 [employer]- All Rights Reserved



Reading location - IP/workstation name: [email or web address]

## 2018-06-02 NOTE — PDOC H&P
History of Present Illness


Admission Date/PCP: 


  06/01/18 17:42





  DEWEY CLEMENTE MD





History of Present Illness: 


SULAIMAN CORREIA is a 53 year old female, She came to the office for 

evaluation of cough shortness of breath, fever and chills she thinks she has 

pneumonia she want to be admitted to the hospital for treatment.  She has a 

history of polysubstance abuse was recently discharged from a drug 

rehabilitation facility.  She was admitted directly from the office ,the 

hemogram showed leukocytosis chest x-ray is negative for acute pneumonia





Past Medical History


Cardiac Medical History: Reports: Hypertension


Pulmonary Medical History: Reports: Asthma - Remote history, Chronic 

Obstructive Pulmonary Disease (COPD), Pneumonia


Neurological Medical History: 


   Denies: Seizures


Endocrine Medical History: Reports: Hypothyroidism


   Denies: Hyperthyroidism


Malignancy Medical History: Reports: Cervical Cancer


GI Medical History: Reports: Gastroesophageal Reflux Disease


Musculoskeltal Medical History: Reports: Arthritis - CERVICAL/BACK DDD, 

Fibromyalgia


Psychiatric Medical History: Reports: Bipolar Disorder, Depression


Hematology: Reports: Anemia


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





Past Surgical History


Past Surgical History: Reports: Appendectomy, Hysterectomy, Orthopedic Surgery 

- Surgery on left foot 2, Tubal Ligation





Social History


Smoking Status: Former Smoker


Frequency of Alcohol Use: None


Hx Recreational Drug Use: No


Drugs: None


Hx Prescription Drug Abuse: No





Family History


Family History: Arthritis, CAD, DM, Hyperlipidemia, Hypertension, Malignancy, 

Thyroid Disfunction


Parental Family History Reviewed: Yes


Children Family History Reviewed: Yes


Sibling(s) Family History Reviewed.: Yes





Medication/Allergy


Home Medications: 








Gabapentin [Neurontin] 600 mg PO Q8 06/01/18 








Allergies/Adverse Reactions: 


 





Shellfish * [Shellfish] Allergy (Severe, Verified 05/10/18 21:02)


 Anaphylaxis


iodine [Iodine] Allergy (Unknown, Verified 05/10/18 21:02)


 Anaphylaxis











Review of Systems


Constitutional: ABSENT: chills, fever(s), headache(s), weight gain, weight loss


Eyes: ABSENT: visual disturbances


Ears: ABSENT: hearing changes


Cardiovascular: ABSENT: chest pain, dyspnea on exertion, edema, orthropnea, 

palpitations


Respiratory: PRESENT: cough, dyspnea


Gastrointestinal: ABSENT: abdominal pain, constipation, diarrhea, hematemesis, 

hematochezia, nausea, vomiting


Genitourinary: ABSENT: dysuria, hematuria


Musculoskeletal: ABSENT: joint swelling


Integumentary: ABSENT: rash, wounds


Neurological: ABSENT: abnormal gait, abnormal speech, confusion, dizziness, 

focal weakness, syncope


Psychiatric: ABSENT: anxiety, depression, homidical ideation, suicidal ideation


Endocrine: ABSENT: cold intolerance, heat intolerance, menstrual abnormalities, 

polydipsia, polyuria


Hematologic/Lymphatic: ABSENT: easy bleeding, easy bruising, lymphadenopathy





Physical Exam


Vital Signs: 


 











Temp Pulse Resp BP Pulse Ox


 


 98 F   75   16   91/60 L  97 


 


 06/02/18 08:00  06/02/18 08:00  06/02/18 08:00  06/02/18 08:00  06/02/18 08:00








 Intake & Output











 06/01/18 06/02/18 06/03/18





 06:59 06:59 06:59


 


Intake Total  880 


 


Output Total  300 


 


Balance  580 


 


Weight  68.039 kg 











General appearance: PRESENT: no acute distress, well-developed, well-nourished


Head exam: PRESENT: atraumatic, normocephalic


Eye exam: PRESENT: conjunctiva pink, EOMI, PERRLA


Ear exam: PRESENT: normal external ear exam


Mouth exam: PRESENT: moist, tongue midline


Neck exam: PRESENT: full ROM


Respiratory exam: PRESENT: crackles


Cardiovascular exam: PRESENT: RRR, +S1, +S2


Pulses: PRESENT: normal dorsalis pedis pul, +2 pedal pulses bilateral


Vascular exam: PRESENT: normal capillary refill


GI/Abdominal exam: PRESENT: normal bowel sounds, soft


Rectal exam: PRESENT: deferred


Neurological exam: PRESENT: alert, awake, oriented to person, oriented to place

, oriented to time, oriented to situation, CN II-XII grossly intact


Psychiatric exam: PRESENT: appropriate affect, normal mood


Skin exam: PRESENT: dry, intact, warm





Results


Laboratory Results: 


 





 06/01/18 19:45 





 06/01/18 19:45 





 











  06/01/18 06/01/18 06/02/18





  19:45 19:45 01:05


 


WBC  13.0 H  


 


RBC  4.85  


 


Hgb  12.1  


 


Hct  37.5  


 


MCV  77 L  


 


MCH  25.0 L  


 


MCHC  32.3  


 


RDW  15.0 H  


 


Plt Count  276  


 


Seg Neutrophils %  72.3  


 


Lymphocytes %  19.0  


 


Monocytes %  7.0  


 


Eosinophils %  0.9  


 


Basophils %  0.8  


 


Absolute Neutrophils  9.4 H  


 


Absolute Lymphocytes  2.5  


 


Absolute Monocytes  0.9  


 


Absolute Eosinophils  0.1  


 


Absolute Basophils  0.1  


 


Sodium   144.6 


 


Potassium   3.5 L 


 


Chloride   103 


 


Carbon Dioxide   25 


 


Anion Gap   17 


 


BUN   18 


 


Creatinine   0.80 


 


Est GFR ( Amer)   > 60 


 


Est GFR (Non-Af Amer)   > 60 


 


Glucose   119 H 


 


Calcium   9.7 


 


Total Bilirubin   1.1 


 


AST   17 


 


ALT   26 


 


Alkaline Phosphatase   86 


 


Total Protein   7.3 


 


Albumin   4.2 


 


Urine Color    YELLOW


 


Urine Appearance    CLEAR


 


Urine pH    6.0


 


Ur Specific Gravity    1.012


 


Urine Protein    >=500 H


 


Urine Glucose (UA)    150 H


 


Urine Ketones    NEGATIVE


 


Urine Blood    NEGATIVE


 


Urine Nitrite    NEGATIVE


 


Ur Leukocyte Esterase    NEGATIVE


 


Urine WBC (Auto)    1


 


Urine RBC (Auto)    1











Impressions: 


 





Chest X-Ray  06/01/18 00:00


IMPRESSION:  NO ACUTE RADIOGRAPHIC FINDING IN THE CHEST.


 














Assessment & Plan





- Diagnosis


(1) Pneumonia


Qualifiers: 


   Pneumonia type: due to unspecified organism   Laterality: unspecified 

laterality   Lung location: unspecified part of lung   Qualified Code(s): J18.9 

- Pneumonia, unspecified organism   


Is this a current diagnosis for this admission?: Yes   


Plan: 


She is admitted to be managed for pneumonia

## 2018-06-02 NOTE — PDOC PROGRESS REPORT
Subjective


Progress Note for:: 06/02/18


Subjective:: 





Patient was admitted because of a pneumonia


Is complaining of pain in the back and also complaining when the cough


pt ask  that patients take some pain medication at home





Reason For Visit: 


PNEUMONIA, HYPOTENSION








Physical Exam


Vital Signs: 


 











Temp Pulse Resp BP Pulse Ox


 


 98 F   75   16   91/60 L  97 


 


 06/02/18 08:00  06/02/18 08:00  06/02/18 08:00  06/02/18 08:00  06/02/18 08:00








 Intake & Output











 06/01/18 06/02/18 06/03/18





 06:59 06:59 06:59


 


Intake Total  880 


 


Output Total  300 


 


Balance  580 


 


Weight  68.039 kg 











General appearance: PRESENT: no acute distress, well-developed, well-nourished


Head exam: PRESENT: atraumatic, normocephalic


Eye exam: PRESENT: conjunctiva pink, EOMI, PERRLA.  ABSENT: scleral icterus


Ear exam: PRESENT: normal external ear exam


Mouth exam: PRESENT: moist, tongue midline


Neck exam: PRESENT: full ROM.  ABSENT: carotid bruit, JVD, lymphadenopathy, 

thyromegaly


Respiratory exam: PRESENT: clear to auscultation precious


Cardiovascular exam: PRESENT: RRR.  ABSENT: diastolic murmur, rubs, systolic 

murmur


Pulses: PRESENT: normal dorsalis pedis pul, +2 pedal pulses bilateral


Vascular exam: PRESENT: normal capillary refill


GI/Abdominal exam: PRESENT: normal bowel sounds, soft.  ABSENT: distended, 

guarding, mass, organolmegaly, rebound, tenderness


Rectal exam: PRESENT: deferred


Neurological exam: PRESENT: alert, awake, oriented to person, oriented to place

, oriented to time, oriented to situation, CN II-XII grossly intact.  ABSENT: 

motor sensory deficit


Psychiatric exam: PRESENT: appropriate affect, normal mood.  ABSENT: homicidal 

ideation, suicidal ideation


Skin exam: PRESENT: dry, intact, warm.  ABSENT: cyanosis, rash





Results


Laboratory Results: 


 





 06/01/18 19:45 





 06/01/18 19:45 





 











  06/01/18 06/01/18 06/02/18





  19:45 19:45 01:05


 


WBC  13.0 H  


 


RBC  4.85  


 


Hgb  12.1  


 


Hct  37.5  


 


MCV  77 L  


 


MCH  25.0 L  


 


MCHC  32.3  


 


RDW  15.0 H  


 


Plt Count  276  


 


Seg Neutrophils %  72.3  


 


Lymphocytes %  19.0  


 


Monocytes %  7.0  


 


Eosinophils %  0.9  


 


Basophils %  0.8  


 


Absolute Neutrophils  9.4 H  


 


Absolute Lymphocytes  2.5  


 


Absolute Monocytes  0.9  


 


Absolute Eosinophils  0.1  


 


Absolute Basophils  0.1  


 


Sodium   144.6 


 


Potassium   3.5 L 


 


Chloride   103 


 


Carbon Dioxide   25 


 


Anion Gap   17 


 


BUN   18 


 


Creatinine   0.80 


 


Est GFR ( Amer)   > 60 


 


Est GFR (Non-Af Amer)   > 60 


 


Glucose   119 H 


 


Calcium   9.7 


 


Total Bilirubin   1.1 


 


AST   17 


 


ALT   26 


 


Alkaline Phosphatase   86 


 


Total Protein   7.3 


 


Albumin   4.2 


 


Urine Color    YELLOW


 


Urine Appearance    CLEAR


 


Urine pH    6.0


 


Ur Specific Gravity    1.012


 


Urine Protein    >=500 H


 


Urine Glucose (UA)    150 H


 


Urine Ketones    NEGATIVE


 


Urine Blood    NEGATIVE


 


Urine Nitrite    NEGATIVE


 


Ur Leukocyte Esterase    NEGATIVE


 


Urine WBC (Auto)    1


 


Urine RBC (Auto)    1











Impressions: 


 





Chest X-Ray  06/01/18 00:00


IMPRESSION:  NO ACUTE RADIOGRAPHIC FINDING IN THE CHEST.


 














Assessment & Plan





- Diagnosis


(1) Pneumonia


Qualifiers: 


   Pneumonia type: due to unspecified organism   Laterality: unspecified 

laterality   Lung location: unspecified part of lung   Qualified Code(s): J18.9 

- Pneumonia, unspecified organism   


Is this a current diagnosis for this admission?: Yes   


Plan: 


Continues IV antibiotic








(2) Cervical radiculopathy


Is this a current diagnosis for this admission?: Yes   


Plan: 


As needed pain medication








- Time


Time Spent with patient: 15-24 minutes


Medications reviewed and adjusted accordingly: Yes


Anticipated discharge: Home


Within: Other





- Inpatient Certification


Medical Necessity: Need Close Monitoring Due to Risk of Patient Decompensation, 

Need for IV Antibiotics


Post Hospital Care: D/C Planner Documentation





- Plan Summary


Plan Summary: 





continue to current medication

## 2018-06-03 NOTE — PDOC PROGRESS REPORT
Subjective


Progress Note for:: 06/03/18


Subjective:: 





Patient was admitted because of a pneumonia


Is complaining of pain in the back and also complaining when the cough


pt ask  that patients take some pain medication at home





Reason For Visit: 


PNEUMONIA, HYPOTENSION








Physical Exam


Vital Signs: 


 











Temp Pulse Resp BP Pulse Ox


 


 98.3 F   74   18   120/71   99 


 


 06/02/18 23:35  06/02/18 23:35  06/02/18 23:35  06/02/18 23:35  06/02/18 23:35








 Intake & Output











 06/02/18 06/03/18 06/04/18





 06:59 06:59 06:59


 


Intake Total 880 2280 


 


Output Total 300  


 


Balance 580 2280 


 


Weight 68.039 kg 68.039 kg 











General appearance: PRESENT: no acute distress, well-developed, well-nourished


Head exam: PRESENT: atraumatic, normocephalic


Eye exam: PRESENT: conjunctiva pink, EOMI, PERRLA.  ABSENT: scleral icterus


Ear exam: PRESENT: normal external ear exam


Mouth exam: PRESENT: moist, tongue midline


Neck exam: PRESENT: full ROM.  ABSENT: carotid bruit, JVD, lymphadenopathy, 

thyromegaly


Respiratory exam: PRESENT: clear to auscultation precious


Cardiovascular exam: PRESENT: RRR.  ABSENT: diastolic murmur, rubs, systolic 

murmur


Pulses: PRESENT: normal dorsalis pedis pul, +2 pedal pulses bilateral


Vascular exam: PRESENT: normal capillary refill


GI/Abdominal exam: PRESENT: normal bowel sounds, soft.  ABSENT: distended, 

guarding, mass, organolmegaly, rebound, tenderness


Rectal exam: PRESENT: deferred


Musculoskeletal exam: PRESENT: ambulatory


Neurological exam: PRESENT: alert, awake, oriented to person, oriented to place

, oriented to time, oriented to situation, CN II-XII grossly intact.  ABSENT: 

motor sensory deficit


Psychiatric exam: PRESENT: appropriate affect, normal mood.  ABSENT: homicidal 

ideation, suicidal ideation


Skin exam: PRESENT: dry, intact, warm.  ABSENT: cyanosis, rash





Results


Laboratory Results: 


 





 06/01/18 19:45 





 06/01/18 19:45 








Impressions: 


 





Chest X-Ray  06/01/18 00:00


IMPRESSION:  NO ACUTE RADIOGRAPHIC FINDING IN THE CHEST.


 














Assessment & Plan





- Diagnosis


(1) Pneumonia


Qualifiers: 


   Pneumonia type: due to unspecified organism   Laterality: unspecified 

laterality   Lung location: unspecified part of lung   Qualified Code(s): J18.9 

- Pneumonia, unspecified organism   


Is this a current diagnosis for this admission?: Yes   


Plan: 


Continues IV antibiotic








(2) Cervical radiculopathy


Is this a current diagnosis for this admission?: Yes   


Plan: 


As needed pain medication








- Time


Time Spent with patient: 15-24 minutes


Medications reviewed and adjusted accordingly: Yes


Anticipated discharge: Other


Within: Other





- Inpatient Certification


Medical Necessity: Need Close Monitoring Due to Risk of Patient Decompensation


Post Hospital Care: D/C Planner Documentation





- Plan Summary


Plan Summary: 





Continues to current medications

## 2018-06-04 NOTE — PDOC DISCHARGE SUMMARY
General





- Admit/Disc Date/PCP


Admission Date/Primary Care Provider: 


  06/01/18 17:42





  DEWEY CLEMENTE MD





Discharge Date: 06/04/18





- Discharge Diagnosis


(1) Pneumonia


Is this a current diagnosis for this admission?: Yes   








- Additional Information


Discharge Diet: As Tolerated


Discharge Activity: Activity As Tolerated


Prescriptions: 


Levofloxacin [Levaquin 750 mg Tablet] 750 mg PO DAILY #10 tab


Home Medications: 








Gabapentin [Neurontin] 600 mg PO Q8 06/01/18 


Levofloxacin [Levaquin 750 mg Tablet] 750 mg PO DAILY #10 tab 06/04/18 











History of Present Illness


History of Present Illness: 


SULAIMAN CORREIA is a 53 year old female, She came to the office for 

evaluation of cough shortness of breath, fever and chills she thinks she has 

pneumonia she want to be admitted to the hospital for treatment.  She has a 

history of polysubstance abuse was recently discharged from a drug 

rehabilitation facility.  She was admitted directly from the office ,the 

hemogram showed leukocytosis chest x-ray is negative for acute pneumonia





Hospital Course


Hospital Course: 





Patient was admitted for the management of pneumonia, from outpatient the 

pretest probability for pneumonia was high she has cough fever leukocytosis the 

initial chest x-ray was negative for pneumonia, this is probably due to 

dehydration clinically on auscultation of the chest there was diffuse crackles.

  She has proteinuria, the protein creatinine ratio was 6.7 consistent with 

nephrotic range proteinuria the serum albumin was normal there is was no 

peripheral edema to suggest nephrotic syndrome.  Patient wants to go home 

today.  She will continue management outpatient





Physical Exam


Vital Signs: 


 











Temp Pulse Resp BP Pulse Ox


 


 99.1 F   91   16   114/78   97 


 


 06/04/18 19:04  06/04/18 19:04  06/04/18 19:04  06/04/18 19:04  06/04/18 19:04








 Intake & Output











 06/03/18 06/04/18 06/05/18





 06:59 06:59 06:59


 


Intake Total 2280 815 752


 


Balance 2280 815 752


 


Weight 68.039 kg  











General appearance: PRESENT: no acute distress, well-developed, well-nourished


Head exam: PRESENT: atraumatic, normocephalic


Eye exam: PRESENT: conjunctiva pink, EOMI, PERRLA


Ear exam: PRESENT: normal external ear exam


Mouth exam: PRESENT: moist, tongue midline


Neck exam: PRESENT: full ROM


Respiratory exam: PRESENT: rhonchi


Cardiovascular exam: PRESENT: RRR, +S1, +S2


Pulses: PRESENT: normal dorsalis pedis pul, +2 pedal pulses bilateral


Vascular exam: PRESENT: normal capillary refill


GI/Abdominal exam: PRESENT: normal bowel sounds, soft


Rectal exam: PRESENT: deferred


Neurological exam: PRESENT: alert, awake, oriented to person, oriented to place

, oriented to time, oriented to situation, CN II-XII grossly intact


Psychiatric exam: PRESENT: appropriate affect, normal mood


Skin exam: PRESENT: dry, intact, warm





Results


Laboratory Results: 


 





 06/04/18 05:32 





 06/04/18 05:28 





 











  06/04/18 06/04/18





  05:28 05:32


 


WBC   11.0 H


 


RBC   4.75


 


Hgb   12.3


 


Hct   37.5


 


MCV   79 L


 


MCH   25.8 L


 


MCHC   32.7


 


RDW   15.3 H


 


Plt Count   294


 


Seg Neutrophils %   55.6


 


Lymphocytes %   34.6


 


Monocytes %   8.3


 


Eosinophils %   0.7


 


Basophils %   0.8


 


Absolute Neutrophils   6.1


 


Absolute Lymphocytes   3.8


 


Absolute Monocytes   0.9


 


Absolute Eosinophils   0.1


 


Absolute Basophils   0.1


 


Sodium  146.4 H 


 


Potassium  4.2 


 


Chloride  111 H 


 


Carbon Dioxide  23 


 


Anion Gap  12 


 


BUN  19 


 


Creatinine  1.44 H 


 


Est GFR ( Amer)  46 L 


 


Est GFR (Non-Af Amer)  38 L 


 


Glucose  111 H 


 


Calcium  9.1 








 





06/02/18 14:37   Sputum   Gram Stain - Final


06/02/18 14:37   Sputum   Sputum Culture - Final


                            NORMAL [facility]








Impressions: 


 





Chest X-Ray  06/01/18 00:00


IMPRESSION:  NO ACUTE RADIOGRAPHIC FINDING IN THE CHEST.


 














Qualifiers





- *


PATIENT BEING DISCHARGED WITH ANY OF THE FOLLOWING DIAGNOSIS: No


VTE patient discharged on overlapping Therapy?: Yes

## 2018-06-04 NOTE — PHYSICIAN ADVISORY NOTE
Physician Advisor ProgressNote


.: 


Pursuant to the  plan for [facility], I have reviewed the medical record 

for this patient.  





Physician Advisor Statement: 





Please clarify in documentation:


1.  What makes attending suspect pneumonia despite negative CXR in this case (?

dehydration initially?  too early in course?  ...)


     - or is pneumonia ruled out & pt actually has acute bronchitis +/- COPD 

exacerbation, or ....?


2.  If pt has pneumonia, what type is it?  (Likely gram-neg?  gram pos? ...)


    - And has pt had pleuritic CP with it?


3.  Medical necessity:  reasons patient has needed ongoing hospitalization past 

the first day despite normal O2 sats, no tachycardia or tachypnea, no increased 

work of breathing or distress documented past first day ....








Thanks for filling out the complete picture in chart so payer can understand 

reasons for care given!


CK








Findings so far documented in support of PNA dx = reported F/C by HPI (Tmax 

here 99.5), with cough, SOB, crackles, leukocytosis, [place], labored breathing 

initially & (+) sputum & wheezes per nursing notes.  Also with "back pain with 

cough", which sounds like a "pleuritic CP" equivalent.

## 2018-08-05 NOTE — RADIOLOGY REPORT (SQ)
EXAM DESCRIPTION:

CT HEAD WITHOUT IV CONTRAST



COMPLETED DATE/TME:  08/05/2018 01:58



CLINICAL HISTORY:

53 years Female, altered mental status



COMPARISON:

12.27.15



TECHNIQUE:  No contrast.  Coronal and sagittal reformat.  This

exam was performed according to our departmental

dose-optimization program, which includes automated exposure

control, adjustment of the mA and/or kV according to patient size

and/or use of iterative reconstruction technique.



FINDINGS:  No hemorrhage or infarct. No mass, mass effect, or

midline shift. 1.3 cm prominent pineal gland with calcification,

stable. Atherosclerosis.  Brain and extra-axial structures appear

otherwise intact.



IMPRESSION: No acute findings.
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
## 2018-11-06 NOTE — RADIOLOGY REPORT (SQ)
EXAM DESCRIPTION:  ANKLE LEFT COMPLETE



COMPLETED DATE/TIME:  11/6/2018 8:13 pm



REASON FOR STUDY:  pain, swelling, contusion



COMPARISON:  None.



NUMBER OF VIEWS:  Three views.



TECHNIQUE:  AP, lateral, and oblique radiographic images acquired of the left ankle.



LIMITATIONS:  None.



FINDINGS:  MINERALIZATION: Normal.

BONES: Arthrodesis involving the tibia and fibula, talus, and calcaneus.  No acute abnormality.

JOINTS: No effusions.

SOFT TISSUES: No soft tissue swelling. No foreign body.

OTHER: No other significant finding.



IMPRESSION:  Surgical changes.  No acute abnormality.



TECHNICAL DOCUMENTATION:  JOB ID:  [id number]

 2011 [employer]- All Rights Reserved



Reading location - IP/workstation name: [email or web address]
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
## 2018-11-06 NOTE — ER DOCUMENT REPORT
ED Medical Screen (RME)





- General


Chief Complaint: Foot Pain


Stated Complaint: LEFT FOOT PAIN, SWELLING


Time Seen by Provider: 11/06/18 19:44


Mode of Arrival: Wheelchair


Information source: Patient


Notes: 





53-year-old female presents emergency department complaints of left foot and 

ankle pain.  Patient is unable to provide an adequate history.  There's 

swelling and a contusion located at the medial aspect of the Left foot/ankle. 

Patient keeps falling asleep and then jerking herself awake. Not answering 

questions in complete sentences because she falls asleep.  I asked the patient 

if she has ingested any illicit drugs.  Patient denies cocaine, marijuana, 

methamphetamine, opiates.





I have greeted and performed a rapid initial assessment of this patient.  A 

comprehensive ED assessment and evaluation of the patient, analysis of test 

results and completion of the medical decision making process will be conducted 

by additional ED providers.





PHYSICAL EXAMINATION:





GENERAL: drowsy. 


Left ankle: Swelling and contusion to the left medial aspect. Tenderness to 

palpation over the contusion. 2+ DP/PT pulses. 


TRAVEL OUTSIDE OF THE [place] IN LAST 30 DAYS: No


COUNTRY TRAVELED TO/FROM: [place]





- Related Data


Allergies/Adverse Reactions: 


 





Shellfish * [Shellfish] Allergy (Severe, Verified 05/10/18 21:02)


 Anaphylaxis


iodine Allergy (Verified 06/02/18 12:08)


 











Past Medical History





- Past Medical History


Cardiac Medical History: Reports: Hx Hypertension


Pulmonary Medical History: Reports: Hx Asthma - Remote history, Hx COPD, Hx 

Pneumonia


Neurological Medical History: Denies: Hx Cerebrovascular Accident, Hx Seizures


Endocrine Medical History: Reports: Hx Hypothyroidism.  Denies: Hx Graves' 

Disease, Hx Hyperthyroidism


Renal/ Medical History: Denies: Hx Peritoneal Dialysis


Malignancy Medical History: Reports: Hx Cervical Cancer


GI Medical History: Reports: Hx Gastroesophageal Reflux Disease, Hx Irritable 

Bowel, Hx Colonoscopy, Hx Endoscopy


Musculoskeltal Medical History: Reports Hx Arthritis - CERVICAL/BACK DDD, 

Reports Hx Fibromyalgia, Reports Hx Musculoskeletal Deformity, Reports Hx 

Musculoskeletal Trauma


Skin Medical History: Reports Hx Cellulitis, Reports Hx MRSA


Psychiatric Medical History: Reports: Hx Anxiety, Hx Bipolar Disorder, Hx 

Depression, Hx Obsessive Compulsive Disorder


Traumatic Medical History: Reports: Hx Fractures - Foot nose ribs


Infectious Medical History: Reports: Hx MRSA


Past Surgical History: Reports: Hx Appendectomy, Hx Bowel Surgery - RESECTION, 

Hx Hysterectomy, Hx Orthopedic Surgery - Surgery on left foot 2, Hx Tubal 

Ligation





- Immunizations


Immunizations up to date: Yes


Hx Diphtheria, Pertussis, Tetanus Vaccination: No - THINKS SHE HAD IN 2006.


History of Influenza Vaccine for 10/2017 - 3/2018 Season: Unknown





Physical Exam





- Vital signs


Vitals: 


 











Temp Pulse Resp BP Pulse Ox


 


 98.7 F   89   20   121/95 H  100 


 


 11/06/18 19:33  11/06/18 19:33  11/06/18 19:33  11/06/18 19:33  11/06/18 19:33














Course





- Vital Signs


Vital signs: 


 











Temp Pulse Resp BP Pulse Ox


 


 98.7 F   89   20   121/95 H  100 


 


 11/06/18 19:33  11/06/18 19:33  11/06/18 19:33  11/06/18 19:33  11/06/18 19:33

## 2018-11-06 NOTE — RADIOLOGY REPORT (SQ)
EXAM DESCRIPTION:  FOOT LEFT COMPLETE



COMPLETED DATE/TIME:  11/6/2018 8:13 pm



REASON FOR STUDY:  pain, swelling, contusion



COMPARISON:  None.



NUMBER OF VIEWS:  Three views.



TECHNIQUE:  AP, lateral and oblique  radiographic images acquired of the left foot.



LIMITATIONS:  None.



FINDINGS:  MINERALIZATION: Normal.

BONES: Arthrodesis involving the hindfoot and ankle.  No acute osseous abnormality is seen.

JOINTS: No effusions.

SOFT TISSUES: No soft tissue swelling.  No foreign body.

OTHER: No other significant finding.



IMPRESSION:  Surgical changes.  No acute abnormality.



TECHNICAL DOCUMENTATION:  JOB ID:  [id number]

 2011 [employer]- All Rights Reserved



Reading location - IP/workstation name: [email or web address]

## 2019-03-23 ENCOUNTER — HOSPITAL ENCOUNTER (EMERGENCY)
Dept: HOSPITAL 62 - ER | Age: 54
Discharge: HOME | End: 2019-03-23
Payer: SELF-PAY

## 2019-03-23 VITALS — SYSTOLIC BLOOD PRESSURE: 138 MMHG | DIASTOLIC BLOOD PRESSURE: 85 MMHG

## 2019-03-23 DIAGNOSIS — F17.200: ICD-10-CM

## 2019-03-23 DIAGNOSIS — E03.9: ICD-10-CM

## 2019-03-23 DIAGNOSIS — F14.10: Primary | ICD-10-CM

## 2019-03-23 DIAGNOSIS — Z86.14: ICD-10-CM

## 2019-03-23 DIAGNOSIS — I10: ICD-10-CM

## 2019-03-23 PROCEDURE — 99284 EMERGENCY DEPT VISIT MOD MDM: CPT

## 2019-03-23 NOTE — ER DOCUMENT REPORT
ED General





- General


Chief Complaint: Drug Abuse


Stated Complaint: POSSIBLE OVERDOSE


Time Seen by Provider: 03/23/19 20:34


Cannot obtain history due to: Intoxicated


Notes: 





Patient is a 53-year-old female with a history of hypertension, polysubstance 

abuse, presents by EMS in police custody due to concerns of acting agitated 

after being found to have been abusing cocaine and possibly heroin.  Patient was

witnessed by a bystander to be smoking crack cocaine in a vehicle, police came 

on scene, patient began to act agitated, irrational, EMS was contacted and 

typically transported the patient here to the emergency department.  The patient

denies any complaints at the time of my assessment.  States that she got very 

anxious regarding the situation and that is why she was acting that manner.  She

has not received any form of Narcan.  Police and EMS did not report any 

depressed mental status at any time.  Their main concern was the patient's 

degree of agitation as well as need for medical clearance prior to taking the 

patient to halfway.


TRAVEL OUTSIDE OF THE [place] IN LAST 30 DAYS: No


COUNTRY TRAVELED TO/FROM: [place]





- Related Data


Allergies/Adverse Reactions: 


                                        





Shellfish * [Shellfish] Allergy (Severe, Verified 05/10/18 21:02)


   Anaphylaxis


iodine Allergy (Verified 06/02/18 12:08)


   











Past Medical History





- General


Information source: Patient





- Social History


Smoking Status: Current Every Day Smoker


Chew tobacco use (# tins/day): No


Frequency of alcohol use: Social


Drug Abuse: Heroin, Other


Lives with: Spouse/Significant other


Family History: Arthritis, CAD, DM, Hyperlipidemia, Hypertension, Malignancy, 

Thyroid Disfunction


Patient has suicidal ideation: No


Patient has homicidal ideation: No





- Past Medical History


Cardiac Medical History: Reports: Hx Hypertension


Pulmonary Medical History: Reports: Hx Asthma - Remote history, Hx COPD, Hx 

Pneumonia


Neurological Medical History: Denies: Hx Cerebrovascular Accident, Hx Seizures


Endocrine Medical History: Reports: Hx Hypothyroidism.  Denies: Hx Graves' 

Disease, Hx Hyperthyroidism


Renal/ Medical History: Denies: Hx Peritoneal Dialysis


Malignancy Medical History: Reports: Hx Cervical Cancer


GI Medical History: Reports: Hx Gastroesophageal Reflux Disease, Hx Irritable 

Bowel, Hx Colonoscopy, Hx Endoscopy


Musculoskeletal Medical History: Reports Hx Arthritis - CERVICAL/BACK DDD, 

Reports Hx Fibromyalgia, Reports Hx Musculoskeletal Deformity, Reports Hx 

Musculoskeletal Trauma


Skin Medical History: Reports Hx Cellulitis, Reports Hx MRSA


Psychiatric Medical History: Reports: Hx Anxiety, Hx Bipolar Disorder, Hx De

pression, Hx Obsessive Compulsive Disorder


Traumatic Medical History: Reports: Hx Fractures - Foot nose ribs


Infectious Medical History: Reports: Hx MRSA


Past Surgical History: Reports: Hx Appendectomy, Hx Bowel Surgery - RESECTION, 

Hx Hysterectomy, Hx Orthopedic Surgery - Surgery on left foot 2, Hx Tubal 

Ligation





- Immunizations


Immunizations up to date: Yes


Hx Diphtheria, Pertussis, Tetanus Vaccination: No - THINKS SHE HAD IN 2006.





Review of Systems





- Review of Systems


Notes: 





Constitutional: Negative for fever.


HENT: Negative for sore throat.


Eyes: Negative for visual changes.


Cardiovascular: Negative for chest pain.


Respiratory: Negative for shortness of breath.


Gastrointestinal: Negative for abdominal pain, vomiting or diarrhea.


Genitourinary: Negative for dysuria.


Musculoskeletal: Negative for back pain.


Skin: Negative for rash.


Neurological: Negative for headaches, weakness or numbness.





10 point ROS negative except as marked above and in HPI.





Physical Exam





- Vital signs


Vitals: 


                                        











Temp Resp


 


 98.1 F   24 H


 


 03/23/19 20:11  03/23/19 20:11











Interpretation: Normal


Notes: 





PHYSICAL EXAMINATION:





GENERAL: Well-appearing, well-nourished and in no acute distress.





HEAD: Atraumatic, normocephalic.





EYES: Pupils equal round and reactive to light, extraocular movements intact, 

sclera anicteric, conjunctiva are normal.





ENT: nares patent, oropharynx clear without exudates.  Moist mucous membranes.





NECK: Normal range of motion, supple without lymphadenopathy





LUNGS: Breath sounds clear to auscultation bilaterally and equal.  No wheezes 

rales or rhonchi.





HEART: Regular rate and rhythm without murmurs





ABDOMEN: Soft, nontender, normoactive bowel sounds.  No guarding, no rebound.  

No masses appreciated.





EXTREMITIES: Normal range of motion, no pitting or edema.  No cyanosis.





NEUROLOGICAL: No focal neurological deficits. Moves all extremities 

spontaneously and on command.





PSYCH: Moderately anxious





SKIN: Warm, Dry, normal turgor, no rashes or lesions noted.





Course





- Re-evaluation


Re-evalutation: 





03/23/19 20:48


Patient presents with police and EMS due to concerns of having used cocaine and 

heroin.  The patient apparently was found using crack cocaine by police, EMS was

 called as the patient began to act somewhat agitated and police were concerned 

that this could be related to substance ingestion.  The patient states that she 

is very anxious about her overall situation but denies any other acute medical 

complaints.  She is calm overall although intermittently fidgeting but is able 

to be redirected.  Medical screening exam unremarkable.  No indication for medic

al screening labs.  Patient does not demonstrate any signs of opiate overdose to

 indicate need for naloxone administration or long-term monitoring.  At this 

time will discharge with return precautions and follow-up recommendations.  

Verbal discharge instructions given a the bedside and opportunity for questions 

given. Medication warnings reviewed. Patient is in agreement with this plan and 

has verbalized understanding of return precautions and the need for primary care

 follow-up in the next 24-72 hours.





- Vital Signs


Vital signs: 


                                        











Temp Pulse Resp BP Pulse Ox


 


 98.0 F      13   138/85 H  94 


 


 03/23/19 21:01     03/23/19 21:01  03/23/19 21:01  03/23/19 21:01














Discharge





- Discharge


Clinical Impression: 


 Polysubstance abuse, Cocaine use, Heroin use





Condition: Good


Disposition: HOME, SELF-CARE


Additional Instructions: 


He was seen today for heroin and cocaine use.  Please never use opiates of any 

kind. Over 200 people die every day in the [place] from opiate overdoses. 

Do not become a statistic. You should urgently seek rehab or a similar resource.

 You can call 3-470-465-YQIN to find local resources. Return if you have any 

symptoms that are concerning to you including difficulty breathing, fever, 

persistent vomiting, or any other symptoms that are concerning to you.

## 2019-12-04 ENCOUNTER — HOSPITAL ENCOUNTER (EMERGENCY)
Dept: HOSPITAL 62 - ER | Age: 54
LOS: 1 days | Discharge: HOME | End: 2019-12-05
Payer: SELF-PAY

## 2019-12-04 DIAGNOSIS — R07.81: ICD-10-CM

## 2019-12-04 DIAGNOSIS — Z91.013: ICD-10-CM

## 2019-12-04 DIAGNOSIS — J44.9: ICD-10-CM

## 2019-12-04 DIAGNOSIS — F17.200: ICD-10-CM

## 2019-12-04 DIAGNOSIS — M54.2: Primary | ICD-10-CM
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
## 2019-12-04 DIAGNOSIS — I10: ICD-10-CM

## 2019-12-04 DIAGNOSIS — Z87.892: ICD-10-CM

## 2019-12-04 DIAGNOSIS — M47.9: ICD-10-CM

## 2019-12-04 DIAGNOSIS — S89.91XA: ICD-10-CM
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
## 2019-12-04 PROCEDURE — 71046 X-RAY EXAM CHEST 2 VIEWS: CPT

## 2019-12-04 PROCEDURE — S0119 ONDANSETRON 4 MG: HCPCS

## 2019-12-04 PROCEDURE — 96374 THER/PROPH/DIAG INJ IV PUSH: CPT

## 2019-12-04 PROCEDURE — 72125 CT NECK SPINE W/O DYE: CPT

## 2019-12-04 PROCEDURE — 99284 EMERGENCY DEPT VISIT MOD MDM: CPT

## 2019-12-04 PROCEDURE — 73560 X-RAY EXAM OF KNEE 1 OR 2: CPT
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
## 2019-12-05 VITALS — SYSTOLIC BLOOD PRESSURE: 115 MMHG | DIASTOLIC BLOOD PRESSURE: 81 MMHG
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
## 2020-02-12 ENCOUNTER — HOSPITAL ENCOUNTER (EMERGENCY)
Dept: HOSPITAL 62 - ER | Age: 55
LOS: 1 days | Discharge: HOME | End: 2020-02-13
Payer: COMMERCIAL

## 2020-02-12 DIAGNOSIS — Z90.710: ICD-10-CM

## 2020-02-12 DIAGNOSIS — L02.411: ICD-10-CM

## 2020-02-12 DIAGNOSIS — Z91.013: ICD-10-CM

## 2020-02-12 DIAGNOSIS — L02.412: Primary | ICD-10-CM

## 2020-02-12 DIAGNOSIS — F17.200: ICD-10-CM

## 2020-02-12 DIAGNOSIS — Z86.14: ICD-10-CM

## 2020-02-12 DIAGNOSIS — I10: ICD-10-CM

## 2020-02-12 DIAGNOSIS — M25.561: ICD-10-CM

## 2020-02-12 LAB
ADD MANUAL DIFF: NO
ALBUMIN SERPL-MCNC: 4.3 G/DL (ref 3.5–5)
ALP SERPL-CCNC: 94 U/L (ref 38–126)
ANION GAP SERPL CALC-SCNC: 10 MMOL/L (ref 5–19)
AST SERPL-CCNC: 17 U/L (ref 14–36)
BASOPHILS # BLD AUTO: 0.1 10^3/UL (ref 0–0.2)
BASOPHILS NFR BLD AUTO: 1.2 % (ref 0–2)
BILIRUB DIRECT SERPL-MCNC: 0.2 MG/DL (ref 0–0.4)
BILIRUB SERPL-MCNC: 0.6 MG/DL (ref 0.2–1.3)
BUN SERPL-MCNC: 17 MG/DL (ref 7–20)
CALCIUM: 9.5 MG/DL (ref 8.4–10.2)
CHLORIDE SERPL-SCNC: 107 MMOL/L (ref 98–107)
CO2 SERPL-SCNC: 23 MMOL/L (ref 22–30)
EOSINOPHIL # BLD AUTO: 0.1 10^3/UL (ref 0–0.6)
EOSINOPHIL NFR BLD AUTO: 0.6 % (ref 0–6)
ERYTHROCYTE [DISTWIDTH] IN BLOOD BY AUTOMATED COUNT: 15.2 % (ref 11.5–14)
GLUCOSE SERPL-MCNC: 93 MG/DL (ref 75–110)
HCT VFR BLD CALC: 42.8 % (ref 36–47)
HGB BLD-MCNC: 14.5 G/DL (ref 12–15.5)
LYMPHOCYTES # BLD AUTO: 3 10^3/UL (ref 0.5–4.7)
LYMPHOCYTES NFR BLD AUTO: 27.2 % (ref 13–45)
MCH RBC QN AUTO: 26.5 PG (ref 27–33.4)
MCHC RBC AUTO-ENTMCNC: 33.9 G/DL (ref 32–36)
MCV RBC AUTO: 78 FL (ref 80–97)
MONOCYTES # BLD AUTO: 0.6 10^3/UL (ref 0.1–1.4)
MONOCYTES NFR BLD AUTO: 5.2 % (ref 3–13)
NEUTROPHILS # BLD AUTO: 7.3 10^3/UL (ref 1.7–8.2)
NEUTS SEG NFR BLD AUTO: 65.8 % (ref 42–78)
PLATELET # BLD: 279 10^3/UL (ref 150–450)
POTASSIUM SERPL-SCNC: 4 MMOL/L (ref 3.6–5)
PROT SERPL-MCNC: 7.7 G/DL (ref 6.3–8.2)
RBC # BLD AUTO: 5.49 10^6/UL (ref 3.72–5.28)
TOTAL CELLS COUNTED % (AUTO): 100 %
WBC # BLD AUTO: 11.1 10^3/UL (ref 4–10.5)

## 2020-02-12 PROCEDURE — 87077 CULTURE AEROBIC IDENTIFY: CPT

## 2020-02-12 PROCEDURE — 36415 COLL VENOUS BLD VENIPUNCTURE: CPT

## 2020-02-12 PROCEDURE — 80053 COMPREHEN METABOLIC PANEL: CPT

## 2020-02-12 PROCEDURE — 10061 I&D ABSCESS COMP/MULTIPLE: CPT

## 2020-02-12 PROCEDURE — 87070 CULTURE OTHR SPECIMN AEROBIC: CPT

## 2020-02-12 PROCEDURE — 85025 COMPLETE CBC W/AUTO DIFF WBC: CPT

## 2020-02-12 PROCEDURE — 87205 SMEAR GRAM STAIN: CPT

## 2020-02-12 NOTE — ER DOCUMENT REPORT
ED Medical Screen (RME)





- General


Stated Complaint: RIGHT KNEE PAIN/LUMPS UNDER ARMPITS


Time Seen by Provider: 02/12/20 18:09


Mode of Arrival: Ambulatory


Information source: Patient


Notes: 





54-year-old female presents emergency department with reports of abscesses under

both arms for the past couple days.  Reports pain with movement and touch.  

Denies history of MRSA.  Also reports of diarrhea for the past 5 days 3-4 times 

a day.  Reports she took over-the-counter medication without relief of symptoms.

 With abdominal cramping.  Patient reports history of C. difficile twice.  No 

complaints of fever and vomiting.











I have greeted and performed a rapid initial assessment of this patient.  A 

comprehensive ED assessment and evaluation of the patient, analysis of test 

results and completion of the medical decision making process will be conducted 

by additional ED providers.


TRAVEL OUTSIDE OF THE [place] IN LAST 30 DAYS: No


COUNTRY TRAVELED TO/FROM: [place]





- Related Data


Allergies/Adverse Reactions: 


                                        





Shellfish * [Shellfish] Allergy (Severe, Verified 02/12/20 18:07)


   Anaphylaxis


iodine Allergy (Verified 02/12/20 18:07)


   











Past Medical History





- Past Medical History


Cardiac Medical History: Reports: Hx Hypertension


Pulmonary Medical History: Reports: Hx Asthma - Remote history, Hx COPD, Hx 

Pneumonia


Neurological Medical History: Denies: Hx Cerebrovascular Accident, Hx Seizures


Endocrine Medical History: Reports: Hx Hypothyroidism.  Denies: Hx Graves' 

Disease, Hx Hyperthyroidism


Renal/ Medical History: Denies: Hx Peritoneal Dialysis


Malignancy Medical History: Reports: Hx Cervical Cancer


GI Medical History: Reports: Hx Gastroesophageal Reflux Disease, Hx Irritable 

Bowel, Hx Colonoscopy, Hx Endoscopy


Musculoskeltal Medical History: Reports Hx Arthritis - CERVICAL/BACK DDD, 

Reports Hx Fibromyalgia, Reports Hx Musculoskeletal Deformity, Reports Hx 

Musculoskeletal Trauma


Skin Medical History: Reports Hx Cellulitis, Reports Hx MRSA


Psychiatric Medical History: Reports: Hx Anxiety, Hx Bipolar Disorder, Hx 

Depression, Hx Obsessive Compulsive Disorder


Traumatic Medical History: Reports: Hx Fractures - Foot nose ribs


Infectious Medical History: Reports: Hx MRSA


Past Surgical History: Reports: Hx Appendectomy, Hx Bowel Surgery - RESECTION, 

Hx Hysterectomy, Hx Orthopedic Surgery - Surgery on left foot 2, Hx Tubal 

Ligation





- Immunizations


Immunizations up to date: Yes


Hx Diphtheria, Pertussis, Tetanus Vaccination: No - THINKS SHE HAD IN 2006.





Physical Exam





- Vital signs


Vitals: 





                                        











Temp Pulse Resp BP Pulse Ox


 


 98 F   72   16   106/69   98 


 


 02/12/20 17:11  02/12/20 17:11  02/12/20 17:11  02/12/20 17:11  02/12/20 17:11














Course





- Vital Signs


Vital signs: 





                                        











Temp Pulse Resp BP Pulse Ox


 


 98 F   72   16   106/69   98 


 


 02/12/20 17:11  02/12/20 17:11  02/12/20 17:11  02/12/20 17:11  02/12/20 17:11

## 2020-02-13 VITALS — DIASTOLIC BLOOD PRESSURE: 62 MMHG | SYSTOLIC BLOOD PRESSURE: 106 MMHG

## 2020-02-13 NOTE — ER DOCUMENT REPORT
ED Skin Rash/Insect Bite/Abscs





- General


Chief Complaint: Abscess


Stated Complaint: RIGHT KNEE PAIN/LUMPS UNDER ARMPITS


Time Seen by Provider: 02/12/20 18:09


Primary Care Provider: 


DEWEY CLEMENTE MD [Primary Care Provider] - Follow up in 3-5 days


Mode of Arrival: Ambulatory


Notes: 





Patient is a 54-year-old female who presents to the emergency department with a 

chief complaint of abscesses to bilateral armpits.  Patient states that she has 

had them for the past couple of days, but they have not gotten any better.  

Denies any fever, body aches, or chills.  Patient does have a history of IV drug

use and has had abscesses in the past.  Patient has history of abscesses in the 

past.  States she has a history of MRSA.  Denies any fever, body aches, chills, 

or any other symptoms.  States that she had diarrhea, but states that she has 

been eating differently and states that she feels the diarrhea is more from that

than C. difficile.  Patient has a history of C. difficile and states that she 

does not feel that her diarrhea is due to C. difficile.


TRAVEL OUTSIDE OF THE [place] IN LAST 30 DAYS: No


COUNTRY TRAVELED TO/FROM: [place]





- Related Data


Allergies/Adverse Reactions: 


                                        





Shellfish * [Shellfish] Allergy (Severe, Verified 02/12/20 18:07)


   Anaphylaxis


iodine Allergy (Verified 02/12/20 18:07)


   











Past Medical History





- General


Information source: Patient





- Social History


Smoking Status: Current Every Day Smoker


Chew tobacco use (# tins/day): No


Frequency of alcohol use: None


Drug Abuse: None


Family History: Arthritis, CAD, DM, Hyperlipidemia, Hypertension, Malignancy, 

Thyroid Disfunction


Patient has suicidal ideation: No


Patient has homicidal ideation: No





- Past Medical History


Cardiac Medical History: Reports: Hx Hypertension


Pulmonary Medical History: Reports: Hx Asthma - Remote history, Hx COPD, Hx 

Pneumonia


Neurological Medical History: Denies: Hx Cerebrovascular Accident, Hx Seizures


Endocrine Medical History: Reports: Hx Hypothyroidism.  Denies: Hx Graves' 

Disease, Hx Hyperthyroidism


Renal/ Medical History: Denies: Hx Peritoneal Dialysis


Malignancy Medical History: Reports: Hx Cervical Cancer


GI Medical History: Reports: Hx Gastroesophageal Reflux Disease, Hx Irritable 

Bowel, Hx Colonoscopy, Hx Endoscopy


Musculoskeletal Medical History: Reports Hx Arthritis - CERVICAL/BACK DDD, 

Reports Hx Fibromyalgia, Reports Hx Musculoskeletal Deformity, Reports Hx 

Musculoskeletal Trauma


Skin Medical History: Reports Hx Cellulitis, Reports Hx MRSA


Psychiatric Medical History: Reports: Hx Anxiety, Hx Bipolar Disorder, Hx 

Depression, Hx Obsessive Compulsive Disorder


Traumatic Medical History: Reports: Hx Fractures - Foot nose ribs


Infectious Medical History: Reports: Hx MRSA


Past Surgical History: Reports: Hx Appendectomy, Hx Bowel Surgery - RESECTION, 

Hx Hysterectomy, Hx Orthopedic Surgery - Surgery on left foot 2, Hx Tubal 

Ligation





- Immunizations


Immunizations up to date: Yes


Hx Diphtheria, Pertussis, Tetanus Vaccination: No - THINKS SHE HAD IN 2006.





Review of Systems





- Review of Systems


Notes: 





REVIEW OF SYSTEMS:





CONSTITUTIONAL :    Denies recent illness.  Denies recent unintentional weight 

loss.  Denies fever,  chills, or sweats. 


EENT: Denies eye, ear, throat, or mouth pain, discharge, or symptoms.  Denies 

nasal or sinus congestion.


CARDIOVASCULAR:  Denies chest pain.


RESPIRATORY: Denies shortness of breath, cough, congestion, difficulty 

breathing, or wheezing. 


GASTROINTESTINAL: See HPI.


GENITOURINARY:  Denies difficulty urinating, burning, blood in urine, urgency or

frequency.


MUSCULOSKELETAL:  Denies neck and back pain.  Denies joint pain or swelling.


SKIN:   See HPI.


HEMATOLOGIC :   Denies easy bruising or bleeding.


LYMPHATIC:  Denies swollen, painful, enlarged glands.


NEUROLOGICAL: Denies no numbness or tingling denies weakness.  Denies headache. 

Denies altered mental status.  Denies alteration in speech.


PSYCHIATRIC:  Denies stress, anxiety, alteration in sleep patterns, or 

depression.





All other systems reviewed and negative.





Physical Exam





- Vital signs


Vitals: 


                                        











Temp Pulse Resp BP Pulse Ox


 


 98 F   72   16   106/69   98 


 


 02/12/20 17:11  02/12/20 17:11  02/12/20 17:11  02/12/20 17:11  02/12/20 17:11














- Notes


Notes: 





PHYSICAL EXAMINATION:





GENERAL: Appears well, healthy, well-nourished, no acute distress. 





HEAD:  Normocephalic, atraumatic.





EYES:  PERRL, conjunctiva normal, all extraocular movements intact, sclera 

nonicteric





ENT:  Moist mucous membranes. 





NECK: Supple, no noticeable swelling, redness, rash.  Normal range of motion.





LUNGS: Equal breath sounds bilaterally and clear to auscultation.  No wheezes 

rales or rhonchi.





CARDIOVASCULAR: S1-S2, regular rate, regular rhythm.  Radial pulses 2+, normal.





ABDOMEN: Normoactive bowel sounds.  Soft, nontender,  no guarding, no rebound 

tenderness, and no masses palpated.





EXTREMITIES: Normal strength and range of motion, no pitting or edema.  No 

cyanosis. 





NEUROLOGICAL: Moves all extremities upon command.  Strength 5/5 in all 

extremities. 





PSYCH: Normal mood, normal affect.





SKIN: Warm, dry.  Normal skin turgor.  Abscess noted to right axilla.  3 

abscesses noted to left axilla area





Course





- Re-evaluation


Re-evalutation: 





02/13/20 


Differential diagnosis includes but normal limited to: abscess, dermoid cyst, 

sebaceous cyst, furnucle, or others.





Based on patient's physical exam and history, these are abscesses.  They were 

drained in the ER.  There is  surrounding cellulitis.  I do not believe the 

patient has underlying necrotizing fasciitis.  Patient has had MRSA in the past.

 Based on patient's physical exam and these factors, they will will be treated 

with antibiotics.  See procedure note.  Follow-up precautions were given.  

Verbal discharge instructions were given to the patient.  They verbalized 

understanding.  They are stable for discharge.








- Vital Signs


Vital signs: 


                                        











Temp Pulse Resp BP Pulse Ox


 


 97.7 F   71   20   106/62   99 


 


 02/13/20 01:54  02/13/20 01:54  02/13/20 01:54  02/13/20 01:54  02/13/20 01:54














- Laboratory


Result Diagrams: 


                                 02/12/20 19:15





                                 02/12/20 19:15


Laboratory results interpreted by me: 


                                        











  02/12/20





  19:15


 


WBC  11.1 H


 


RBC  5.49 H


 


MCV  78 L


 


MCH  26.5 L


 


RDW  15.2 H














Procedures





- Incision and Drainage


  ** Right axilla


Type: Simple


Anesthetic type: 1% Lidocaine w/epi


mL's of anesthetic: 8


Blade size: 11


I&D procedure: Shurclens applied, Iodoform packing placed


Incision Method: Incision made by scalpel


Amount/type of drainage: 4 mL's/purulent and bloody





  ** Left axilla


Type: Simple, Multiple


Anesthetic type: 1% Lidocaine w/epi


mL's of anesthetic: 10


Blade size: 11


I&D procedure: Shurclens applied


Incision Method: Incision made by scalpel


Amount/type of drainage: 10 mL's/purulent and bloody-4 separate abscesses





Discharge





- Discharge


Clinical Impression: 


 Abscess





Cellulitis


Qualifiers:


 Site of cellulitis: extremity Site of cellulitis of extremity: axilla 

Laterality: unspecified laterality Qualified Code(s): L03.119 - Cellulitis of 

unspecified part of limb





Condition: Stable


Disposition: HOME, SELF-CARE


Instructions:  Abscess ([facility]), Post Incision and Drainage, Trimethoprim-Sulfa 

([facility])


Additional Instructions: 


You were seen for an abscess that required drainage. Please clean this area with

soap and water twice daily and apply a topical antibiotic. Dress the area after 

each cleaning.  For your packing, please pull the packing out in 48 hours and 

reapply packing.  Please return if you develop fever, vomiting, the pain at the 

site worsens, you notice spreading redness from the area, or you have any other 

symptoms that are concerning to you.





You are being placed on antibiotics.  Please follow-up with your primary care 

provider in regards to this visit.


Prescriptions: 


Sulfamethoxazole/Trimethoprim [Bactrim Ds Tablet] 1 each PO BID 7 Days #14 

tablet


Cephalexin Monohydrate [Keflex 500 mg Capsule] 500 mg PO Q6H 7 Days #28 capsule


Referrals: 


DEWEY CLEMENTE MD [Primary Care Provider] - Follow up in 3-5 days

## 2020-02-18 ENCOUNTER — HOSPITAL ENCOUNTER (EMERGENCY)
Dept: HOSPITAL 62 - ER | Age: 55
Discharge: HOME | End: 2020-02-18
Payer: COMMERCIAL

## 2020-02-18 VITALS — SYSTOLIC BLOOD PRESSURE: 100 MMHG | DIASTOLIC BLOOD PRESSURE: 65 MMHG

## 2020-02-18 DIAGNOSIS — R19.7: Primary | ICD-10-CM

## 2020-02-18 DIAGNOSIS — Z86.14: ICD-10-CM

## 2020-02-18 DIAGNOSIS — F17.210: ICD-10-CM

## 2020-02-18 DIAGNOSIS — M54.9: ICD-10-CM

## 2020-02-18 DIAGNOSIS — R10.9: ICD-10-CM

## 2020-02-18 DIAGNOSIS — I10: ICD-10-CM

## 2020-02-18 DIAGNOSIS — Z91.013: ICD-10-CM

## 2020-02-18 LAB
ADD MANUAL DIFF: NO
ALBUMIN SERPL-MCNC: 4.2 G/DL (ref 3.5–5)
ALP SERPL-CCNC: 85 U/L (ref 38–126)
ANION GAP SERPL CALC-SCNC: 12 MMOL/L (ref 5–19)
APPEARANCE UR: (no result)
APTT PPP: YELLOW S
AST SERPL-CCNC: 16 U/L (ref 14–36)
BASOPHILS # BLD AUTO: 0.1 10^3/UL (ref 0–0.2)
BASOPHILS NFR BLD AUTO: 1 % (ref 0–2)
BILIRUB DIRECT SERPL-MCNC: 0.3 MG/DL (ref 0–0.4)
BILIRUB SERPL-MCNC: 0.6 MG/DL (ref 0.2–1.3)
BILIRUB UR QL STRIP: NEGATIVE
BUN SERPL-MCNC: 19 MG/DL (ref 7–20)
CALCIUM: 9.3 MG/DL (ref 8.4–10.2)
CHLORIDE SERPL-SCNC: 104 MMOL/L (ref 98–107)
CO2 SERPL-SCNC: 24 MMOL/L (ref 22–30)
EOSINOPHIL # BLD AUTO: 0 10^3/UL (ref 0–0.6)
EOSINOPHIL NFR BLD AUTO: 0.6 % (ref 0–6)
ERYTHROCYTE [DISTWIDTH] IN BLOOD BY AUTOMATED COUNT: 15.2 % (ref 11.5–14)
GLUCOSE SERPL-MCNC: 90 MG/DL (ref 75–110)
GLUCOSE UR STRIP-MCNC: NEGATIVE MG/DL
HCT VFR BLD CALC: 42.7 % (ref 36–47)
HGB BLD-MCNC: 14.3 G/DL (ref 12–15.5)
KETONES UR STRIP-MCNC: (no result) MG/DL
LYMPHOCYTES # BLD AUTO: 2.7 10^3/UL (ref 0.5–4.7)
LYMPHOCYTES NFR BLD AUTO: 33.6 % (ref 13–45)
MCH RBC QN AUTO: 26.1 PG (ref 27–33.4)
MCHC RBC AUTO-ENTMCNC: 33.4 G/DL (ref 32–36)
MCV RBC AUTO: 78 FL (ref 80–97)
MONOCYTES # BLD AUTO: 0.6 10^3/UL (ref 0.1–1.4)
MONOCYTES NFR BLD AUTO: 6.9 % (ref 3–13)
NEUTROPHILS # BLD AUTO: 4.7 10^3/UL (ref 1.7–8.2)
NEUTS SEG NFR BLD AUTO: 57.9 % (ref 42–78)
NITRITE UR QL STRIP: NEGATIVE
PH UR STRIP: 6 [PH] (ref 5–9)
PLATELET # BLD: 279 10^3/UL (ref 150–450)
POTASSIUM SERPL-SCNC: 4 MMOL/L (ref 3.6–5)
PROT SERPL-MCNC: 7.5 G/DL (ref 6.3–8.2)
PROT UR STRIP-MCNC: NEGATIVE MG/DL
RBC # BLD AUTO: 5.46 10^6/UL (ref 3.72–5.28)
SP GR UR STRIP: 1.02
TOTAL CELLS COUNTED % (AUTO): 100 %
UROBILINOGEN UR-MCNC: 2 MG/DL (ref ?–2)
WBC # BLD AUTO: 8.2 10^3/UL (ref 4–10.5)

## 2020-02-18 PROCEDURE — 81001 URINALYSIS AUTO W/SCOPE: CPT

## 2020-02-18 PROCEDURE — 96360 HYDRATION IV INFUSION INIT: CPT

## 2020-02-18 PROCEDURE — 80053 COMPREHEN METABOLIC PANEL: CPT

## 2020-02-18 PROCEDURE — 85025 COMPLETE CBC W/AUTO DIFF WBC: CPT

## 2020-02-18 PROCEDURE — 36415 COLL VENOUS BLD VENIPUNCTURE: CPT

## 2020-02-18 PROCEDURE — 99284 EMERGENCY DEPT VISIT MOD MDM: CPT

## 2020-02-18 PROCEDURE — 87040 BLOOD CULTURE FOR BACTERIA: CPT

## 2020-02-18 PROCEDURE — 74176 CT ABD & PELVIS W/O CONTRAST: CPT

## 2020-02-18 PROCEDURE — 83690 ASSAY OF LIPASE: CPT

## 2020-02-18 NOTE — RADIOLOGY REPORT (SQ)
EXAM DESCRIPTION:  CT ABD/PELVIS NO ORAL OR IV



COMPLETED DATE/TIME:  2/18/2020 6:37 pm



REASON FOR STUDY:  LLQ, RLQ abd pain, hx of diverticulitis



COMPARISON:  CT of the abdomen pelvis without contrast from 5/16/2018.



TECHNIQUE:  CT scan of the abdomen and pelvis performed without intravenous or oral contrast. Images 
reviewed with lung, soft tissue, and bone windows. Reconstructed coronal and sagittal MPR images revi
ewed. All images stored on PACS.

All CT scanners at this facility use dose modulation, iterative reconstruction, and/or weight based d
osing when appropriate to reduce radiation dose to as low as reasonably achievable (ALARA).

[facility]: Dose Right  [facility]: CareDose    [facility]: Dose Right    [facility]: Teradose 4D    [facility]: Smart [employer]



RADIATION DOSE:  CT Rad equipment meets quality standard of care and radiation dose reduction techniq
ues were employed. CTDIvol: 8.4 mGy. DLP: 462 mGy-cm.



LIMITATIONS:  None.



FINDINGS:  LOWER CHEST: No acute findings.

NON-CONTRASTED LIVER, SPLEEN, ADRENALS: Evaluation is limited due to the absence of intravenous contr
ast.  There is no CT evidence of hepatic steatosis.  The spleen is normal in size.  There is no abnor
mality of the adrenal glands.

PANCREAS: No acute gross abnormality of the pancreas.

GALLBLADDER: No abnormality that is apparent on CT.

RIGHT KIDNEY AND URETER: Evaluation is limited due to the absence of intravenous contrast.  There is 
no hydronephrosis, nephrolithiasis, hydroureter or ureterolithiasis.

LEFT KIDNEY AND URETER: Evaluation is limited due to the absence of intravenous contrast.  There is n
o hydronephrosis, nephrolithiasis, hydroureter or ureterolithiasis.

AORTA AND RETROPERITONEUM: No aneurysm of the abdominal aorta.  No retroperitoneal adenopathy, hemorr
linda or mass.

BOWEL AND PERITONEAL CAVITY: No bowel obstruction, bowel wall thickening or pericolonic/ perienteric 
inflammation.  No mesenteric adenopathy, free intraperitoneal fluid or mesenteric/ omental inflammati
on.

APPENDIX: Unable to identify the appendix.  There is no pericecal inflammation.

PELVIS, BLADDER, AND ABDOMINAL WALL:The urinary bladder is distended and normal in appearance.

BONES: Schmorl's node deformity at the superior endplate of L1.

OTHER: No other finding.



IMPRESSION:  No acute intra-abdominal abnormality.



COMMENT:  Quality ID # 436: Final reports with documentation of one or more dose reduction techniques
 (e.g., Automated exposure control, adjustment of the mA and/or kV according to patient size, use of 
iterative reconstruction technique)



TECHNICAL DOCUMENTATION:  JOB ID:  [id number]

 2011 [employer]- All Rights Reserved



Reading location - IP/workstation name: [email or web address]

## 2020-04-18 ENCOUNTER — HOSPITAL ENCOUNTER (EMERGENCY)
Dept: HOSPITAL 62 - ER | Age: 55
Discharge: HOME | End: 2020-04-18
Payer: COMMERCIAL

## 2020-04-18 VITALS — SYSTOLIC BLOOD PRESSURE: 113 MMHG | DIASTOLIC BLOOD PRESSURE: 85 MMHG

## 2020-04-18 DIAGNOSIS — R40.0: Primary | ICD-10-CM

## 2020-04-18 DIAGNOSIS — F14.10: ICD-10-CM

## 2020-04-18 DIAGNOSIS — N39.0: ICD-10-CM

## 2020-04-18 DIAGNOSIS — F17.210: ICD-10-CM

## 2020-04-18 DIAGNOSIS — I10: ICD-10-CM

## 2020-04-18 DIAGNOSIS — F19.10: ICD-10-CM

## 2020-04-18 LAB
ADD MANUAL DIFF: NO
ALBUMIN SERPL-MCNC: 5.1 G/DL (ref 3.5–5)
ALP SERPL-CCNC: 100 U/L (ref 38–126)
ANION GAP SERPL CALC-SCNC: 9 MMOL/L (ref 5–19)
APAP SERPL-MCNC: < 10 UG/ML (ref 10–30)
APPEARANCE UR: (no result)
APTT PPP: YELLOW S
AST SERPL-CCNC: 24 U/L (ref 14–36)
BARBITURATES UR QL SCN: NEGATIVE
BASOPHILS # BLD AUTO: 0.1 10^3/UL (ref 0–0.2)
BASOPHILS NFR BLD AUTO: 0.6 % (ref 0–2)
BILIRUB DIRECT SERPL-MCNC: 0.1 MG/DL (ref 0–0.4)
BILIRUB SERPL-MCNC: 1 MG/DL (ref 0.2–1.3)
BILIRUB UR QL STRIP: NEGATIVE
BUN SERPL-MCNC: 17 MG/DL (ref 7–20)
CALCIUM: 9.8 MG/DL (ref 8.4–10.2)
CHLORIDE SERPL-SCNC: 103 MMOL/L (ref 98–107)
CK MB SERPL-MCNC: < 0.22 NG/ML (ref ?–4.55)
CK SERPL-CCNC: 42 U/L (ref 30–135)
CO2 SERPL-SCNC: 27 MMOL/L (ref 22–30)
EOSINOPHIL # BLD AUTO: 0 10^3/UL (ref 0–0.6)
EOSINOPHIL NFR BLD AUTO: 0.2 % (ref 0–6)
ERYTHROCYTE [DISTWIDTH] IN BLOOD BY AUTOMATED COUNT: 16.5 % (ref 11.5–14)
ETHANOL SERPL-MCNC: < 10 MG/DL
GLUCOSE SERPL-MCNC: 119 MG/DL (ref 75–110)
GLUCOSE UR STRIP-MCNC: NEGATIVE MG/DL
HCT VFR BLD CALC: 45.9 % (ref 36–47)
HGB BLD-MCNC: 15.3 G/DL (ref 12–15.5)
KETONES UR STRIP-MCNC: NEGATIVE MG/DL
LYMPHOCYTES # BLD AUTO: 1.8 10^3/UL (ref 0.5–4.7)
LYMPHOCYTES NFR BLD AUTO: 15.2 % (ref 13–45)
MCH RBC QN AUTO: 26.6 PG (ref 27–33.4)
MCHC RBC AUTO-ENTMCNC: 33.3 G/DL (ref 32–36)
MCV RBC AUTO: 80 FL (ref 80–97)
METHADONE UR QL SCN: NEGATIVE
MONOCYTES # BLD AUTO: 0.6 10^3/UL (ref 0.1–1.4)
MONOCYTES NFR BLD AUTO: 4.7 % (ref 3–13)
NEUTROPHILS # BLD AUTO: 9.4 10^3/UL (ref 1.7–8.2)
NEUTS SEG NFR BLD AUTO: 79.3 % (ref 42–78)
NITRITE UR QL STRIP: NEGATIVE
PCP UR QL SCN: NEGATIVE
PH UR STRIP: 5 [PH] (ref 5–9)
PLATELET # BLD: 241 10^3/UL (ref 150–450)
POTASSIUM SERPL-SCNC: 4.5 MMOL/L (ref 3.6–5)
PROT SERPL-MCNC: 8.6 G/DL (ref 6.3–8.2)
PROT UR STRIP-MCNC: NEGATIVE MG/DL
RBC # BLD AUTO: 5.74 10^6/UL (ref 3.72–5.28)
SALICYLATES SERPL-MCNC: < 1 MG/DL (ref 2–20)
SP GR UR STRIP: 1.02
TOTAL CELLS COUNTED % (AUTO): 100 %
TROPONIN I SERPL-MCNC: < 0.012 NG/ML
URINE AMPHETAMINES SCREEN: NEGATIVE
URINE BENZODIAZEPINES SCREEN: NEGATIVE
URINE COCAINE SCREEN: (no result)
URINE MARIJUANA (THC) SCREEN: NEGATIVE
UROBILINOGEN UR-MCNC: NEGATIVE MG/DL (ref ?–2)
WBC # BLD AUTO: 11.9 10^3/UL (ref 4–10.5)

## 2020-04-18 PROCEDURE — 82553 CREATINE MB FRACTION: CPT

## 2020-04-18 PROCEDURE — 80053 COMPREHEN METABOLIC PANEL: CPT

## 2020-04-18 PROCEDURE — 99291 CRITICAL CARE FIRST HOUR: CPT

## 2020-04-18 PROCEDURE — 87591 N.GONORRHOEAE DNA AMP PROB: CPT

## 2020-04-18 PROCEDURE — 93010 ELECTROCARDIOGRAM REPORT: CPT

## 2020-04-18 PROCEDURE — 84484 ASSAY OF TROPONIN QUANT: CPT

## 2020-04-18 PROCEDURE — 81001 URINALYSIS AUTO W/SCOPE: CPT

## 2020-04-18 PROCEDURE — 99292 CRITICAL CARE ADDL 30 MIN: CPT

## 2020-04-18 PROCEDURE — 80307 DRUG TEST PRSMV CHEM ANLYZR: CPT

## 2020-04-18 PROCEDURE — 36415 COLL VENOUS BLD VENIPUNCTURE: CPT

## 2020-04-18 PROCEDURE — 70450 CT HEAD/BRAIN W/O DYE: CPT

## 2020-04-18 PROCEDURE — 87491 CHLMYD TRACH DNA AMP PROBE: CPT

## 2020-04-18 PROCEDURE — 85025 COMPLETE CBC W/AUTO DIFF WBC: CPT

## 2020-04-18 PROCEDURE — 82550 ASSAY OF CK (CPK): CPT

## 2020-04-18 PROCEDURE — 93005 ELECTROCARDIOGRAM TRACING: CPT

## 2020-04-18 RX ADMIN — CEFTRIAXONE ONE: 1 INJECTION, SOLUTION INTRAVENOUS at 23:15

## 2020-04-18 NOTE — RADIOLOGY REPORT (SQ)
EXAM DESCRIPTION:  CT HEAD WITHOUT



IMAGES COMPLETED DATE/TIME:  4/18/2020 6:07 pm



REASON FOR STUDY:  ms changes



COMPARISON:  8/5/2018



TECHNIQUE:  Axial images acquired through the brain without intravenous contrast.  Images reviewed wi
th bone, brain and subdural windows.  Additional sagittal and coronal reconstructions were generated.
 Images stored on PACS.

All CT scanners at this facility use dose modulation, iterative reconstruction, and/or weight based d
osing when appropriate to reduce radiation dose to as low as reasonably achievable (ALARA).

[facility]: Dose Right  [facility]: CareDose    [facility]: Dose Right    [facility]: Teradose 4D    [facility]: Smart [employer]



RADIATION DOSE:  CT Rad equipment meets quality standard of care and radiation dose reduction techniq
ues were employed. CTDIvol: 53.2 mGy. DLP: 964 mGy-cm. mGy.



LIMITATIONS:  None.



FINDINGS:  VENTRICLES: Normal size and contour.

CEREBRUM: No masses.  No hemorrhage.  No midline shift.  No evidence for acute infarction. Normal gra
y/white matter differentiation. No areas of low density in the white matter.

CEREBELLUM: No masses.  No hemorrhage.  No alteration of density.  No evidence for acute infarction.

EXTRAAXIAL SPACES: No fluid collections.  No masses.

ORBITS AND GLOBE: No intra- or extraconal masses.  Normal contour of globe without masses.

CALVARIUM: No fracture.

PARANASAL SINUSES: No fluid or mucosal thickening.

SOFT TISSUES: No mass or hematoma.

OTHER: No other significant finding.



IMPRESSION:  No acute intracranial hemorrhage, mass, or evidence of acute territorial infarct.

EVIDENCE OF ACUTE STROKE: NO.



COMMENT:  Quality ID # 436: Final reports with documentation of one or more dose reduction techniques
 (e.g., Automated exposure control, adjustment of the mA and/or kV according to patient size, use of 
iterative reconstruction technique)



TECHNICAL DOCUMENTATION:  JOB ID:  [id number]

 2011 [employer]- All Rights Reserved



Reading location - IP/workstation name: [email or web address]

## 2020-04-18 NOTE — EKG REPORT
SEVERITY:- ABNORMAL ECG -

SINUS RHYTHM

PROBABLE LEFT ATRIAL ABNORMALITY

LVH WITH SECONDARY REPOLARIZATION ABNORMALITY

:

Confirmed by: Tristen Wen MD 18-Apr-2020 20:46:37

## 2020-04-18 NOTE — ER DOCUMENT REPORT
ED General





- General


Stated Complaint: UNRESPONSIVE


Time Seen by Provider: 04/18/20 17:20


Primary Care Provider: 


DEWEY CLEMENTE MD [Primary Care Provider] - Follow up as needed


Mode of Arrival: Medic


Information source: Patient, Emergency Med Personnel


Cannot obtain history due to: Altered mental status


Notes: 





55-year-old black female arrives by EMS with sleepiness and drowsiness.  Family 

called EMS because she has a history of narcotic use in the past.  Patient 

reports she was excessively sleepy now because she had insomnia last night.  

Patient reports she did take ibuprofen for her left lower leg which is status 

post ORIF and also last year injured her right knee.  Patient denies taking any 

over-the-counter illegal drugs or other persons narcotics.  She reports she was 

on Xanax in the past but denies using that recently.  Patient reports she has a 

history of seizures.  Much of the history is from [facility] who reports patient 

passed out but by the time of arrival patient was quite oriented to self and 

situation.


TRAVEL OUTSIDE OF THE [place] IN LAST 30 DAYS: Yes





- HPI


Onset: Just prior to arrival


Onset/Duration: Sudden


Quality of pain: No pain


Severity: None


Associated symptoms: Weakness


Exacerbated by: Denies


Similar symptoms previously: Yes


Recently seen / treated by doctor: No





- Related Data


Allergies/Adverse Reactions: 


                                        





Shellfish * [Shellfish] Allergy (Severe, Verified 02/18/20 15:50)


   Anaphylaxis


iodine Allergy (Verified 02/18/20 15:50)


   











Past Medical History





- General


Information source: Patient, Emergency Med Personnel





- Social History


Smoking Status: Current Every Day Smoker


Cigarette use (# per day): Yes


Chew tobacco use (# tins/day): No


Smoking Education Provided: Yes


Frequency of alcohol use: Occasional


Drug Abuse: Other - Patient denies


Lives with: Family


Family History: Arthritis, CAD, DM, Hyperlipidemia, Hypertension, Malignancy, 

Thyroid Disfunction





- Past Medical History


Cardiac Medical History: Reports: Hx Hypertension


Pulmonary Medical History: Reports: Hx Asthma - Remote history, Hx COPD, Hx 

Pneumonia


Neurological Medical History: Denies: Hx Cerebrovascular Accident, Hx Seizures


Endocrine Medical History: Reports: Hx Hypothyroidism.  Denies: Hx Graves' 

Disease, Hx Hyperthyroidism


Renal/ Medical History: Denies: Hx Peritoneal Dialysis


Malignancy Medical History: Reports: Hx Cervical Cancer


GI Medical History: Reports: Hx Gastroesophageal Reflux Disease, Hx Irritable 

Bowel, Hx Colonoscopy, Hx Endoscopy


Musculoskeletal Medical History: Reports Hx Arthritis - CERVICAL/BACK DDD, 

Reports Hx Fibromyalgia, Reports Hx Musculoskeletal Deformity, Reports Hx 

Musculoskeletal Trauma


Skin Medical History: Reports Hx Cellulitis, Reports Hx MRSA


Psychiatric Medical History: Reports: Hx Anxiety, Hx Bipolar Disorder, Hx 

Depression, Hx Obsessive Compulsive Disorder


Traumatic Medical History: Reports: Hx Fractures - Foot nose ribs


Infectious Medical History: Reports: Hx MRSA


Past Surgical History: Reports: Hx Appendectomy, Hx Bowel Surgery - RESECTION, 

Hx Hysterectomy, Hx Orthopedic Surgery - Surgery on left foot 2, Hx Tubal 

Ligation





- Immunizations


Immunizations up to date: Yes


Hx Diphtheria, Pertussis, Tetanus Vaccination: No - THINKS SHE HAD IN 2006.





Review of Systems





- Review of Systems


Constitutional: See HPI, Weakness, Other - Very somnolent but easily awakened


EENT: No symptoms reported


Cardiovascular: No symptoms reported


Respiratory: No symptoms reported


Gastrointestinal: No symptoms reported


Genitourinary: No symptoms reported


Female Genitourinary: No symptoms reported


Musculoskeletal: No symptoms reported


Skin: No symptoms reported


Hematologic/Lymphatic: No symptoms reported


Neurological/Psychological: No symptoms reported





Physical Exam





- Vital signs


Vitals: 


                                        











Resp Pulse Ox


 


 12   97 


 


 04/18/20 17:16  04/18/20 17:16











Interpretation: Normal





- General


General appearance: Lethargic





- HEENT


Head: Normocephalic, Atraumatic


Eyes: Normal


Pupils: PERRL


Nasal: Normal


Mouth/Lips: Normal


Mucous membranes: Normal


Pharynx: Normal


Neck: Normal





- Respiratory


Respiratory status: No respiratory distress


Chest status: Nontender


Breath sounds: Normal


Chest palpation: Normal





- Cardiovascular


Rhythm: Regular


Heart sounds: Normal auscultation


Murmur: No





- Abdominal


Inspection: Normal


Distension: No distension


Bowel sounds: Normal


Tenderness: Nontender


Organomegaly: No organomegaly





- Genitourinary


External exam: Normal - Examined while staff ER were in room female and male





- Back


Back: Normal





- Extremities


General upper extremity: Normal inspection


General lower extremity: Normal inspection





- Neurological


Neuro grossly intact: Yes


Cognition: Inattentive


Orientation: AAOx4


[place] Coma Scale Eye Opening: Spontaneous


[place] Coma Scale Verbal: Oriented


[place] Coma Scale Motor: Obeys Commands


[place] Coma Scale Total: 15


Speech: Normal


Cranial nerves: Normal


Cerebellar coordination: Normal


Motor strength normal: LUE, RUE, LLE, RLE





- Psychological


Associated symptoms: Other - Very sleepy at this time of exam around 1715





- Skin


Skin Temperature: Warm


Skin Moisture: Dry





Course





- Vital Signs


Vital signs: 


                                        











Temp Pulse Resp BP Pulse Ox


 


 98.6 F      18   125/80   98 


 


 04/18/20 21:07     04/18/20 21:01  04/18/20 21:01  04/18/20 21:01














- Laboratory


Result Diagrams: 


                                 04/18/20 17:40





                                 04/18/20 17:40


Laboratory results interpreted by me: 


                                        











  04/18/20 04/18/20 04/18/20





  17:40 17:40 20:50


 


WBC  11.9 H  


 


RBC  5.74 H  


 


MCH  26.6 L  


 


RDW  16.5 H  


 


Absolute Neuts (auto)  9.4 H  


 


Seg Neutrophils %  79.3 H  


 


Glucose   119 H 


 


Total Protein   8.6 H 


 


Albumin   5.1 H 


 


Ur Leukocyte Esterase    LARGE H


 


Salicylates   < 1.0 L 


 


Acetaminophen   < 10 L 














Critical Care Note





- Critical Care Note


Total time excluding time spent on procedures (mins): 90





Discharge





- Discharge


Clinical Impression: 


 Somnolence, daytime, Cocaine abuse, Narcotic abuse, episodic





UTI (urinary tract infection)


Qualifiers:


 Urinary tract infection type: site unspecified Hematuria presence: without 

hematuria Qualified Code(s): N39.0 - Urinary tract infection, site not specified





Condition: Good


Disposition: HOME, SELF-CARE


Instructions:  Urinary Tract Infection ([facility])


Additional Instructions: 


Follow-up with personal doctor this week return to ER as needed stop using 

cocaine or narcotics as you are very concerned and take medicines as directed 

for your UTI and encourage fluids


Prescriptions: 


Ciprofloxacin HCl [Cipro 500 mg Tablet] 500 mg PO BID #20 tablet


Referrals: 


DEWEY CLEMENTE MD [Primary Care Provider] - Follow up as needed

## 2020-04-19 LAB — CHLAM PCR: NOT DETECTED

## 2020-04-19 RX ADMIN — CEFTRIAXONE ONE: 1 INJECTION, SOLUTION INTRAVENOUS at 00:10

## 2020-06-14 ENCOUNTER — HOSPITAL ENCOUNTER (EMERGENCY)
Dept: HOSPITAL 62 - ER | Age: 55
LOS: 1 days | Discharge: HOME | End: 2020-06-15
Payer: COMMERCIAL
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
## 2020-06-14 DIAGNOSIS — I10: ICD-10-CM

## 2020-06-14 DIAGNOSIS — T40.1X1A: Primary | ICD-10-CM

## 2020-06-14 DIAGNOSIS — J44.9: ICD-10-CM

## 2020-06-14 DIAGNOSIS — R68.83: ICD-10-CM

## 2020-06-14 DIAGNOSIS — F14.10: ICD-10-CM

## 2020-06-14 DIAGNOSIS — Z91.013: ICD-10-CM

## 2020-06-14 DIAGNOSIS — Z87.892: ICD-10-CM

## 2020-06-14 LAB
ADD MANUAL DIFF: NO
BASOPHILS # BLD AUTO: 0.1 10^3/UL (ref 0–0.2)
BASOPHILS NFR BLD AUTO: 0.4 % (ref 0–2)
CK MB SERPL-MCNC: < 0.22 NG/ML (ref ?–4.55)
EOSINOPHIL # BLD AUTO: 0.1 10^3/UL (ref 0–0.6)
EOSINOPHIL NFR BLD AUTO: 0.4 % (ref 0–6)
ERYTHROCYTE [DISTWIDTH] IN BLOOD BY AUTOMATED COUNT: 14.7 % (ref 11.5–14)
HCT VFR BLD CALC: 45.5 % (ref 36–47)
HGB BLD-MCNC: 14.9 G/DL (ref 12–15.5)
LYMPHOCYTES # BLD AUTO: 5.6 10^3/UL (ref 0.5–4.7)
LYMPHOCYTES NFR BLD AUTO: 37 % (ref 13–45)
MCH RBC QN AUTO: 27.1 PG (ref 27–33.4)
MCHC RBC AUTO-ENTMCNC: 32.7 G/DL (ref 32–36)
MCV RBC AUTO: 83 FL (ref 80–97)
MONOCYTES # BLD AUTO: 0.8 10^3/UL (ref 0.1–1.4)
MONOCYTES NFR BLD AUTO: 5.3 % (ref 3–13)
NEUTROPHILS # BLD AUTO: 8.7 10^3/UL (ref 1.7–8.2)
NEUTS SEG NFR BLD AUTO: 56.9 % (ref 42–78)
PLATELET # BLD: 285 10^3/UL (ref 150–450)
RBC # BLD AUTO: 5.48 10^6/UL (ref 3.72–5.28)
TOTAL CELLS COUNTED % (AUTO): 100 %
TROPONIN I SERPL-MCNC: < 0.012 NG/ML
WBC # BLD AUTO: 15.2 10^3/UL (ref 4–10.5)

## 2020-06-14 PROCEDURE — 71045 X-RAY EXAM CHEST 1 VIEW: CPT

## 2020-06-14 PROCEDURE — 82550 ASSAY OF CK (CPK): CPT

## 2020-06-14 PROCEDURE — 93010 ELECTROCARDIOGRAM REPORT: CPT

## 2020-06-14 PROCEDURE — 36415 COLL VENOUS BLD VENIPUNCTURE: CPT

## 2020-06-14 PROCEDURE — 80307 DRUG TEST PRSMV CHEM ANLYZR: CPT

## 2020-06-14 PROCEDURE — 96374 THER/PROPH/DIAG INJ IV PUSH: CPT

## 2020-06-14 PROCEDURE — 84484 ASSAY OF TROPONIN QUANT: CPT

## 2020-06-14 PROCEDURE — 81001 URINALYSIS AUTO W/SCOPE: CPT

## 2020-06-14 PROCEDURE — 99284 EMERGENCY DEPT VISIT MOD MDM: CPT

## 2020-06-14 PROCEDURE — 96376 TX/PRO/DX INJ SAME DRUG ADON: CPT

## 2020-06-14 PROCEDURE — 80053 COMPREHEN METABOLIC PANEL: CPT

## 2020-06-14 PROCEDURE — 51702 INSERT TEMP BLADDER CATH: CPT

## 2020-06-14 PROCEDURE — 93005 ELECTROCARDIOGRAM TRACING: CPT

## 2020-06-14 PROCEDURE — 82553 CREATINE MB FRACTION: CPT

## 2020-06-14 PROCEDURE — 96361 HYDRATE IV INFUSION ADD-ON: CPT

## 2020-06-14 PROCEDURE — 85025 COMPLETE CBC W/AUTO DIFF WBC: CPT

## 2020-06-14 NOTE — EKG REPORT
SEVERITY:- BORDERLINE ECG -

SINUS RHYTHM

PROBABLE LEFT ATRIAL ABNORMALITY

:

Confirmed by: Kathrine Pearl MD 14-Jun-2020 22:42:50

## 2020-06-14 NOTE — RADIOLOGY REPORT (SQ)
EXAM DESCRIPTION: 



XR CHEST 1 VIEW



COMPLETED DATE/TME:  06/14/2020 00:00



CLINICAL HISTORY: 



55 years, Female, altered 



Compared to chest radiograph dated 12/5/2019.



FINDINGS: Heart is mildly enlarged. Lungs are clear. No pleural

effusions. No pneumothorax.



IMPRESSION:



No acute disease.

## 2020-06-15 VITALS — DIASTOLIC BLOOD PRESSURE: 75 MMHG | SYSTOLIC BLOOD PRESSURE: 114 MMHG

## 2020-06-15 LAB
ALBUMIN SERPL-MCNC: 3.9 G/DL (ref 3.5–5)
ALP SERPL-CCNC: 78 U/L (ref 38–126)
ANION GAP SERPL CALC-SCNC: 8 MMOL/L (ref 5–19)
APPEARANCE UR: (no result)
APTT PPP: YELLOW S
AST SERPL-CCNC: 19 U/L (ref 14–36)
BARBITURATES UR QL SCN: NEGATIVE
BILIRUB DIRECT SERPL-MCNC: 0 MG/DL (ref 0–0.4)
BILIRUB SERPL-MCNC: 0.7 MG/DL (ref 0.2–1.3)
BILIRUB UR QL STRIP: NEGATIVE
BUN SERPL-MCNC: 18 MG/DL (ref 7–20)
CALCIUM: 8.7 MG/DL (ref 8.4–10.2)
CHLORIDE SERPL-SCNC: 107 MMOL/L (ref 98–107)
CO2 SERPL-SCNC: 22 MMOL/L (ref 22–30)
GLUCOSE SERPL-MCNC: 144 MG/DL (ref 75–110)
GLUCOSE UR STRIP-MCNC: NEGATIVE MG/DL
KETONES UR STRIP-MCNC: NEGATIVE MG/DL
METHADONE UR QL SCN: NEGATIVE
NITRITE UR QL STRIP: NEGATIVE
PCP UR QL SCN: NEGATIVE
PH UR STRIP: 8 [PH] (ref 5–9)
POTASSIUM SERPL-SCNC: 4.4 MMOL/L (ref 3.6–5)
PROT SERPL-MCNC: 7 G/DL (ref 6.3–8.2)
PROT UR STRIP-MCNC: NEGATIVE MG/DL
SP GR UR STRIP: 1.02
URINE AMPHETAMINES SCREEN: NEGATIVE
URINE BENZODIAZEPINES SCREEN: NEGATIVE
URINE COCAINE SCREEN: (no result)
URINE MARIJUANA (THC) SCREEN: NEGATIVE
UROBILINOGEN UR-MCNC: NEGATIVE MG/DL (ref ?–2)

## 2020-06-15 NOTE — ER DOCUMENT REPORT
Entered by JEREL COLLINS SCRIBE  20 2100 





Acting as scribe for:ANTHONY WARD IV, MD





ED General





- General


Chief Complaint: Altered Mental Status


Stated Complaint: POSSIBLE ALLERGIC REACTION/POSSIBLE OVERDOSE


Primary Care Provider: 


DEWEY CLEMENTE MD [Primary Care Provider] - Follow up as needed


Mode of Arrival: Medic


Information source: Patient, Emergency Med Personnel


Notes: 





This 55 year old female patient brought in by EMS from home presents to the ED 

today with complaints of altered mental status that occurred just prior to 

arrival. EMS reports that they were called out because the patient went 

unresponsive after eating dinner. Family states that the patient is allergic to 

shellfish and that they were cooking some in the house, but they deny that the 

patient ate any. EMS states that the patient's O2 sats were initially in the 70s

on RA with agonal respirations, so they provided assisted respirations via BVM 

and placed a nasal trumpet to the L nare. EMS administered x3 rounds of epi, 125

mg Solumedrol, and 50 mg Benadryl without improvement in patient's 

responsiveness. EMS states that they then administered 0.4 mg Narcan in the 

ambulance bay and the patient became alert. Per EMS, family reports a past 

medical history of seizures, but denies any medications. Patient verifies that 

she is allergic to iodine and shellfish.


TRAVEL OUTSIDE OF THE [place] IN LAST 30 DAYS: Yes





- Related Data


Allergies/Adverse Reactions: 


                                        





Shellfish * [Shellfish] Allergy (Severe, Verified 20 20:55)


   Anaphylaxis


iodine Allergy (Verified 20 20:55)


   











Past Medical History





- General


Information source: Emergency Med Personnel, [facility] Records





- Social History


Smoking Status: Unknown if Ever Smoked


Cigarette use (# per day): No


Chew tobacco use (# tins/day): No


Smoking Education Provided: No


Drug Abuse: None


Lives with: Family


Family History: Arthritis, CAD, DM, Hyperlipidemia, Hypertension, Malignancy, 

Thyroid Disfunction


Patient has suicidal ideation: No


Patient has homicidal ideation: No





- Past Medical History


Cardiac Medical History: Reports: Hx Hypertension


Pulmonary Medical History: Reports: Hx Asthma - Remote history, Hx COPD, Hx 

Pneumonia


Neurological Medical History: Reports: Hx Seizures


Endocrine Medical History: Reports: Hx Hypothyroidism


Malignancy Medical History: Reports: Hx Cervical Cancer


GI Medical History: Reports: Hx Gastroesophageal Reflux Disease, Hx Irritable 

Bowel, Hx Colonoscopy, Hx Endoscopy


Musculoskeletal Medical History: Reports Hx Arthritis - CERVICAL/BACK DDD, 

Reports Hx Fibromyalgia, Reports Hx Musculoskeletal Deformity, Reports Hx 

Musculoskeletal Trauma


Skin Medical History: Reports Hx Cellulitis, Reports Hx MRSA


Psychiatric Medical History: Reports: Hx Anxiety, Hx Bipolar Disorder, Hx D

epression, Hx Obsessive Compulsive Disorder


Traumatic Medical History: Reports: Hx Fractures - Foot nose ribs


Infectious Medical History: Reports: Hx MRSA


Past Surgical History: Reports: Hx Appendectomy, Hx Bowel Surgery - RESECTION, 

Hx Hysterectomy, Hx Orthopedic Surgery - Surgery on left foot 2, Hx Tubal 

Ligation





- Immunizations


Immunizations up to date: Yes


Hx Diphtheria, Pertussis, Tetanus Vaccination: No - THINKS SHE HAD IN .





Review of Systems





- Review of Systems


Constitutional: See HPI, Chills


EENT: No symptoms reported


Cardiovascular: No symptoms reported


Respiratory: No symptoms reported


Gastrointestinal: No symptoms reported


Genitourinary: No symptoms reported


Female Genitourinary: No symptoms reported


Musculoskeletal: No symptoms reported


Skin: No symptoms reported


Hematologic/Lymphatic: No symptoms reported


Neurological/Psychological: See HPI, Other - Altered mental status


-: Yes All other systems reviewed and negative





Physical Exam





- Vital signs


Vitals: 


                                        











Temp


 


 97.4 F 


 


 20 20:52














- General


General appearance: Alert


In distress: None





- HEENT


Head: Normocephalic, Atraumatic


Eyes: Normal


Pupils: Pinpoint





- Respiratory


Respiratory status: No respiratory distress


Chest status: Nontender


Breath sounds: Normal


Chest palpation: Normal





- Cardiovascular


Rhythm: Regular, Tachycardia


Heart sounds: Normal auscultation


Murmur: No


Friction rub: No


Gallop: None auscultated





- Abdominal


Inspection: Normal


Distension: No distension


Bowel sounds: Normal


Tenderness: Nontender - Abdomen soft


Organomegaly: No organomegaly





- Back


Back: Normal, Nontender





- Extremities


General upper extremity: Normal inspection


General lower extremity: Normal inspection





- Neurological


Neuro grossly intact: Yes


[place] Coma Scale Eye Opening: Spontaneous


[place] Coma Scale Verbal: Oriented


[place] Coma Scale Motor: Obeys Commands


[place] Coma Scale Total: 15





- Psychological


Associated symptoms: Normal affect, Normal mood





- Skin


Skin Temperature: Warm


Skin Moisture: Dry


Skin Color: Normal





Course





- Re-evaluation


Re-evalutation: 





06/15/20 05:08


Patient is alert and oriented x3.  Results of ED MSE discussed with patient.  

Patient states she is remorseful about having used heroin and cocaine.  Patient 

states she had some "clean time" under her belt but relapsed.  All questions 

were answered prior to discharge.  Emergency signs and symptoms, reasons to 

return to the emergency department discussed with patient.





- Vital Signs


Vital signs: 


                                        











Temp Pulse Resp BP Pulse Ox


 


 97.4 F   96   17   139/84 H  91 L


 


 20 20:52  06/15/20 03:00  06/15/20 05:00  06/15/20 04:46  06/15/20 05:00














- Laboratory


Result Diagrams: 


                                 20 20:56





                                 06/15/20 02:26


Laboratory results interpreted by me: 


                                        











  06/14/20 06/15/20





  20:56 02:26


 


WBC  15.2 H 


 


RBC  5.48 H 


 


RDW  14.7 H 


 


Absolute Neuts (auto)  8.7 H 


 


Absolute Lymphs (auto)  5.6 H 


 


Sodium   136.7 L


 


Glucose   144 H














- Diagnostic Test


Radiology reviewed: Reports reviewed





- EKG Interpretation by Me


Additional EKG results interpreted by me: 





06/15/20 05:09


EKG obtained on 2020 at 2113 hrs. was interpreted by this MD.  Findings: 

Normal sinus rhythm, rate 97, normal axis, P waves preceding QRS complexes, QRS 

complexes appear narrow, there are no obvious patterns of ST segment elevation 

or depression present to suggest acute myocardial ischemia or infarction.  

Impression: Normal sinus rhythm with nonspecific ST segments.





Discharge





- Discharge


Clinical Impression: 


 Cocaine abuse





Accidental heroin overdose


Qualifiers:


 Encounter type: initial encounter Qualified Code(s): T40.1X1A - Poisoning by 

heroin, accidental (unintentional), initial encounter





Condition: Good


Disposition: HOME, SELF-CARE


Additional Instructions: 


Return to the Emergency Department without delay if any worse.











HOME CARE INSTRUCTIONS & INFORMATION:  Thank you for choosing us for your medi

catie needs. We hope you're satisfied with the care you received.  After you 

leave, you must properly care for your problem and, at the same time, observe 

its progress.  Any condition can change.  Some illnesses can change rapidly over

 hours or days.  If your condition worsens, return to the Emergency Department 

or see your physician promptly.





ABOUT YOUR X-RAYS AND EKG'S:   If you had an EKG or X-rays taken, they have been

 read by the Emergency Physician. The X-rays and EKG's will also be read by a 

Radiologist or Cardiologist within 24 hours.  If discrepancies are noted, you 

will be notified by telephone.  Please be certain the ED has a correct telephone

 number & address where you can be reached.  Also, realize that some fractures 

or abnormalities do not show up on initial X-rays.  If your symptoms continue, 

see your physician.





ABOUT YOUR LABORATORY TEST:   If you had laboratory tests, the results have been

 reviewed by the Emergency Physician.  Some test results (for example cultures) 

may not be available for several days.  You will be contacted if any test result

 shows you need additional treatment.  Please be certain the ED has a correct 

telephone number and address where you can be reached.





ABOUT YOUR MEDICATIONS:  You will receive instructions on how to take your 

medicine on the prescription label you receive.  Additional information may be 

provided by the Pharmacy.  If you have questions afterwards, call the ED for 

clarification or further instructions.  Some prescribed medications may cause 

drowsiness.  Do not perform tasks such as driving a car or operating machinery 

without consulting your Pharmacist.  If you feel you need a refill of pain 

medication, your condition will need re-evaluation.  Please do not call for a 

refill of any medication.





ABOUT YOUR SIGNATURE:   Signature of this document acknowledges to followin. Understanding that you received emergency treatment and that you may be 

   released before al medical problems are known or treated. Please be certain  

    the ED has a correct phone number & address where you can be reached.


   2. Acknowledgement that you will arrange for follow-up care as recommended.


   3. Authorization for the Emergency Physician to provide information to your 

follow-up Physician in order to maximize your care.





AT ANY TIME, IF YOUR SYMPTOMS CHANGE SIGNIFICANTLY OR WORSEN OR YOU DEVELOP NEW 

SYMPTOMS, RETURN TO THE EMERGENCY DEPARTMENT IMMEDIATELY FOR RE-EVALUATION.





OUR GOAL IS TO PROVIDE EXCELLENT MEDICAL CARE!





WE HOPE THAT WE HAVE MET YOUR EXPECTATIONS DURING YOUR EMERGENCY DEPARTMENT 

VISIT AND THAT YOU FEEL YOU HAVE RECEIVED EXCELLENT CARE!








Cocaine Abuse





     Cocaine causes many dangerous medical problems.  Problems can occur even 

with "usual" amounts.  Cocaine affects judgement, creating a sense of 

invulnerability.  Cocaine users often make bad decisions that seem "great" at 

the time.  Most cocaine users eventually will be hurt by bad job performance, 

damaged personal relations, crime, and unsafe sexual practices.


     Toxic effects of cocaine can include seizures, hallucinations, delusions, 

high blood pressure, heart damage, or sudden death.  There's always the risk of 

a "bad batch."  But heart attacks, brain hemorrhages, or cardiac arrest can 

occur unpredictably even with "normal" use.


     Injection of cocaine is risky for abscesses, endocarditis (heart 

infection), pneumonia, and AIDS.


     Withdrawal from cocaine often causes anxiety and drug cravings. Some users 

become paranoid and psychotic.


     Many treatment programs are available, but you must make the decision to 

quit.  Medication can be prescribed to control the symptoms of cocaine toxicity 

(beta blockers or benzodiazepines).  Withdrawal symptoms may require 

tranquilizers.








Referrals: 


DEWEY CLEMENTE MD [Primary Care Provider] - Follow up as needed





I personally performed the services described in the documentation, reviewed and

edited the documentation which was dictated to the scribe in my presence, and it

accurately records my words and actions.

## 2020-10-11 ENCOUNTER — HOSPITAL ENCOUNTER (EMERGENCY)
Dept: HOSPITAL 62 - ER | Age: 55
Discharge: HOME | End: 2020-10-11
Payer: COMMERCIAL

## 2020-10-11 VITALS — DIASTOLIC BLOOD PRESSURE: 68 MMHG | SYSTOLIC BLOOD PRESSURE: 123 MMHG

## 2020-10-11 DIAGNOSIS — B96.89: ICD-10-CM

## 2020-10-11 DIAGNOSIS — Z87.892: ICD-10-CM

## 2020-10-11 DIAGNOSIS — J44.9: ICD-10-CM

## 2020-10-11 DIAGNOSIS — F17.200: ICD-10-CM

## 2020-10-11 DIAGNOSIS — Z91.013: ICD-10-CM

## 2020-10-11 DIAGNOSIS — M25.562: Primary | ICD-10-CM

## 2020-10-11 DIAGNOSIS — N76.0: ICD-10-CM

## 2020-10-11 DIAGNOSIS — N30.00: ICD-10-CM

## 2020-10-11 DIAGNOSIS — F14.10: ICD-10-CM

## 2020-10-11 DIAGNOSIS — Z85.41: ICD-10-CM

## 2020-10-11 DIAGNOSIS — Z79.891: ICD-10-CM

## 2020-10-11 DIAGNOSIS — F11.10: ICD-10-CM

## 2020-10-11 DIAGNOSIS — G89.29: ICD-10-CM

## 2020-10-11 DIAGNOSIS — M25.462: ICD-10-CM

## 2020-10-11 DIAGNOSIS — I10: ICD-10-CM

## 2020-10-11 LAB
APPEARANCE UR: (no result)
APTT PPP: YELLOW S
BACTERIA (WET MOUNT): (no result)
BILIRUB UR QL STRIP: NEGATIVE
CHLAM PCR: NOT DETECTED
GLUCOSE UR STRIP-MCNC: NEGATIVE MG/DL
KETONES UR STRIP-MCNC: NEGATIVE MG/DL
NITRITE UR QL STRIP: NEGATIVE
PH UR STRIP: 5 [PH] (ref 5–9)
PROT UR STRIP-MCNC: NEGATIVE MG/DL
SP GR UR STRIP: 1.02
T.VAGINALIS (WET MOUNT): (no result)
UROBILINOGEN UR-MCNC: 2 MG/DL (ref ?–2)
WBCS (WET MOUNT): (no result)
YEAST (WET MOUNT): (no result)

## 2020-10-11 PROCEDURE — 87210 SMEAR WET MOUNT SALINE/INK: CPT

## 2020-10-11 PROCEDURE — 87591 N.GONORRHOEAE DNA AMP PROB: CPT

## 2020-10-11 PROCEDURE — 99285 EMERGENCY DEPT VISIT HI MDM: CPT

## 2020-10-11 PROCEDURE — 81001 URINALYSIS AUTO W/SCOPE: CPT

## 2020-10-11 PROCEDURE — 93971 EXTREMITY STUDY: CPT

## 2020-10-11 PROCEDURE — 87491 CHLMYD TRACH DNA AMP PROBE: CPT

## 2020-10-11 NOTE — ER DOCUMENT REPORT
ED General





- General


Chief Complaint: Leg Pain


Stated Complaint: LEG PAIN


Time Seen by Provider: 10/11/20 13:09


Primary Care Provider: 


DEWEY CLEMENTE MD [Primary Care Provider] - Follow up as needed


Mode of Arrival: Ambulatory


TRAVEL OUTSIDE OF THE [place] IN LAST 30 DAYS: Yes





- HPI


Notes: 





55-year-old female to the emergency department with complaints of pain behind 

her left knee that began yesterday.  Initially it started in her ankle and then 

came all the way up to the back of the left knee.  She states it feels swollen. 

States it hurts to bend the knee.  She denies any falls.  She is in a methadone 

clinic.  She is concerned for a clot in her leg.  She has not recently been 

traveling, has not had surgery recently, has not had shortness of breath, denies

exogenous hormone use, denies history of DVT or PE self.  She also reports that 

for the past 3 days she is noticed vaginal "leakage".  She states it has an 

odor.  She states she has not been sexually active because her significant other

is in senior care.  She denies any fevers, chills, abdominal pain, pelvic pain, vaginal

bleeding, flank pain.  She denies any urinary symptoms.





- Related Data


Allergies/Adverse Reactions: 


                                        





Shellfish * [Shellfish] Allergy (Severe, Verified 06/14/20 20:55)


   Anaphylaxis


iodine Allergy (Verified 06/14/20 20:55)


   








Home Medications: methadone





Past Medical History





- General


Information source: Patient





- Social History


Smoking Status: Current Every Day Smoker


Chew tobacco use (# tins/day): No


Frequency of alcohol use: None


Drug Abuse: Cocaine, Heroin


Family History: Arthritis, CAD, DM, Hyperlipidemia, Hypertension, Malignancy, 

Thyroid Disfunction





- Past Medical History


Cardiac Medical History: Reports: Hx Hypertension


Pulmonary Medical History: Reports: Hx Asthma - Remote history, Hx COPD, Hx 

Pneumonia


Neurological Medical History: Reports: Hx Seizures.  Denies: Hx Cerebrovascular 

Accident


Endocrine Medical History: Reports: Hx Hypothyroidism.  Denies: Hx Graves' 

Disease, Hx Hyperthyroidism


Renal/ Medical History: Denies: Hx Peritoneal Dialysis


Malignancy Medical History: Reports: Hx Cervical Cancer


GI Medical History: Reports: Hx Gastroesophageal Reflux Disease, Hx Irritable 

Bowel, Hx Colonoscopy, Hx Endoscopy


Musculoskeletal Medical History: Reports Hx Arthritis - CERVICAL/BACK DDD, 

Reports Hx Fibromyalgia, Reports Hx Musculoskeletal Deformity, Reports Hx 

Musculoskeletal Trauma


Skin Medical History: Reports Hx Cellulitis, Reports Hx MRSA


Psychiatric Medical History: Reports: Hx Anxiety, Hx Bipolar Disorder, Hx 

Depression, Hx Obsessive Compulsive Disorder


Traumatic Medical History: Reports: Hx Fractures - Foot nose ribs


Infectious Medical History: Reports: Hx MRSA


Past Surgical History: Reports: Hx Appendectomy, Hx Bowel Surgery - RESECTION, 

Hx Hysterectomy, Hx Orthopedic Surgery - Surgery on left foot 2, Hx Tubal 

Ligation





- Immunizations


Immunizations up to date: Yes


Hx Diphtheria, Pertussis, Tetanus Vaccination: No - THINKS SHE HAD IN 2006.





Review of Systems





- Review of Systems


Constitutional: denies: Chills, Fever


EENT: No symptoms reported


Cardiovascular: denies: Chest pain, Palpitations, Dizziness, Lightheaded


Respiratory: denies: Cough, Hurts to breathe, Short of breath


Gastrointestinal: denies: Abdominal pain, Diarrhea, Nausea


Female Genitourinary: See HPI, Vaginal discharge


Musculoskeletal: See HPI, Joint pain, Leg swelling


Skin: No symptoms reported


Hematologic/Lymphatic: No symptoms reported


Neurological/Psychological: No symptoms reported


-: Yes All other systems reviewed and negative





Physical Exam





- Vital signs


Vitals: 


                                        











Temp Pulse Resp BP Pulse Ox


 


 97.3 F   86   20   112/71   96 


 


 10/11/20 12:35  10/11/20 12:35  10/11/20 12:35  10/11/20 12:35  10/11/20 12:35











Interpretation: Normal





- General


General appearance: Appears well, Alert


In distress: None





- HEENT


Head: Normocephalic, Atraumatic


Eyes: Normal


Pupils: PERRL





- Respiratory


Respiratory status: No respiratory distress


Chest status: Nontender


Breath sounds: Normal.  No: Rales, Rhonchi, Wheezing


Chest palpation: Normal





- Cardiovascular


Rhythm: Regular


Heart sounds: Normal auscultation


Murmur: No





- Abdominal


Inspection: Normal


Distension: No distension


Bowel sounds: Normal


Tenderness: Nontender.  No: Tender, McBurney's point, Haider's sign, Guarding


Organomegaly: No organomegaly





- Back


Back: Normal, Nontender.  No: CVA tenderness





- Extremities


General upper extremity: Normal inspection, Nontender, Normal color, Normal ROM,

 Normal temperature


General lower extremity: Other - There is tenderness to palpation over the left 

knee joint with noted fullness in the popliteal fossa.  This is very tender to 

palpation.  She has increased pain with flexion of the knee.  However her 

strength is intact with 5 out of 5 strength against resistance in flexion and 

extension.  Nontender to palpation over the left ankle.  Minor tenderness to 

palpation over the posterior calf.  There is no erythema or warmth.





- Neurological


Neuro grossly intact: Yes


Cognition: Normal


Orientation: AAOx4


[place] Coma Scale Eye Opening: Spontaneous


[place] Coma Scale Verbal: Oriented


[place] Coma Scale Motor: Obeys Commands


[place] Coma Scale Total: 15


Speech: Normal


Cranial nerves: Normal


Cerebellar coordination: Normal.  No: Gait ataxia


Motor strength normal: LUE, RUE, LLE, RLE


Additional motor exam normals: Equal .  No: Pronator drift


Sensory: Normal





- Psychological


Associated symptoms: Normal affect, Normal mood





- Skin


Skin Temperature: Warm


Skin Moisture: Dry


Skin Color: Normal





Course





- Re-evaluation


Re-evalutation: 





10/11/20 15:15


Impression: UTI and bacterial vaginosis.  Will treat bacterial vaginosis now 

with Flagyl.  Left knee pain and fullness.  Noted effusion on x-ray.  She has a 

negative Doppler study.  We will have her follow-up outpatient with orthopedist.

  Will send home with muscle relaxants for the knee as well as a lidocaine 

patch.  She is on methadone for chronic pain and history of substance abuse.  

Will send home with Keflex for UTI and also write for Diflucan to prevent yeast 

infection.  Patient agrees with plan.  Will discharge home.





- Vital Signs


Vital signs: 


                                        











Temp Pulse Resp BP Pulse Ox


 


 97.3 F   86   20   112/71   96 


 


 10/11/20 13:10  10/11/20 12:35  10/11/20 12:35  10/11/20 12:35  10/11/20 12:35














- Laboratory


Laboratory results interpreted by me: 


                                        











  10/11/20





  13:23


 


Urine Urobilinogen  2.0 H


 


Ur Leukocyte Esterase  LARGE H











10/11/20 15:17





Laboratory











  10/11/20 10/11/20





  13:23 14:52


 


Urine Color  YELLOW 


 


Urine Appearance  SLIGHTLY-CLOUDY 


 


Urine pH  5.0 


 


Ur Specific Gravity  1.024 


 


Urine Protein  NEGATIVE 


 


Urine Glucose (UA)  NEGATIVE 


 


Urine Ketones  NEGATIVE 


 


Urine Blood  NEGATIVE 


 


Urine Nitrite  NEGATIVE 


 


Urine Bilirubin  NEGATIVE 


 


Urine Urobilinogen  2.0 H 


 


Ur Leukocyte Esterase  LARGE H 


 


Urine WBC (Auto)  67 


 


Urine RBC (Auto)  5 


 


Urine Bacteria (Auto)  TRACE 


 


Squamous Epi Cells Auto  2 


 


Urine Mucus (Auto)  RARE 


 


Urine Ascorbic Acid  NEGATIVE 


 


Bacteria (Wet Prep)   3+ BACTERIA SEEN


 


Trichomonas (Wet Prep)   NO TRICHOMONAS SEEN


 


Vaginal WBC   2+ WBCS SEEN


 


Vaginal Yeast   NO YEAST SEEN











                                        





Venous Doppler Study  10/11/20 13:14


IMPRESSION:  NO EVIDENCE OF DVT OR SVT IN THE LEFT LEG.


 








Knee X-Ray  10/11/20 13:15


IMPRESSION:  NO FRACTURE.  Small joint effusion.


 














Procedures





- Pelvic Exam


  ** Pelvic exam


Time completed: 15:15


Cultures obtained: Yes


Wet prep obtained: Yes


Bimanual exam performed: Yes


Notes: 





10/11/20 15:15


Small amount of thin vaginal discharge without notable odor.  No lesions.  No 

tenderness to palpation or fullness during bimanual exam.  GC and chlamydia 

swabs were obtained as well as wet mount.  External genitalia within normal 

limits.





Discharge





- Discharge


Clinical Impression: 


 Bacterial vaginosis, Swelling of left knee joint, Knee effusion, left





UTI (urinary tract infection)


Qualifiers:


 Urinary tract infection type: acute cystitis Hematuria presence: without 

hematuria Qualified Code(s): N30.00 - Acute cystitis without hematuria





Left knee pain


Qualifiers:


 Chronicity: acute Qualified Code(s): M25.562 - Pain in left knee





Condition: Stable


Disposition: HOME, SELF-CARE


Instructions:  Urinary Tract Infection ([facility]), Knee Effusion ([facility])


Additional Instructions: 


Follow-up outpatient with orthopedist without fail.  You had a negative 

ultrasound today for clot in the leg.  Your x-ray of your knee shows a little 

bit of fluid.  You tested positive for bacterial vaginosis on your wet prep from

 your vaginal specimens.  You were given antibiotics in the emergency department

 for this.  He will be written for antibiotics for her urinary tract infection 

to take.  Take them until they are gone.  You will be also written for an anti-

yeast pill to prevent yeast infection from the antibiotics.  Take that after 

completing all antibiotics. return if worse.


Prescriptions: 


Fluconazole [Diflucan] 150 mg PO ONCE PRN #1 tablet


 PRN Reason: 


Cephalexin Monohydrate [Keflex 500 mg Capsule] 500 mg PO BID #14 capsule


Lidocaine [Lidoderm 5% (700 mg) Transdermal Patch] 1 patch TP DAILY #30 

adh..patch


Methocarbamol [Robaxin 500 mg Tablet] 500 mg PO QID #20 tablet


Referrals: 


DEWEY CLEMENTE MD [Primary Care Provider] - Follow up in 3-5 days


POORNIMA WILSON JR, DO [ACTIVE PROVISIONAL STAFF] - Follow up in 1 week (for 

orthopedic follow up)

## 2020-10-11 NOTE — RADIOLOGY REPORT (SQ)
EXAM DESCRIPTION:  KNEE LEFT 4 VIEW



IMAGES COMPLETED DATE/TIME:  10/11/2020 1:42 pm



REASON FOR STUDY:  pain



COMPARISON:  4/30/2010



EXAM PARAMETERS:  NUMBER OF VIEWS: Four views.

TECHNIQUE: AP, lateral and oblique  radiographic images acquired of the left knee.

LIMITATIONS: None.



FINDINGS:  MINERALIZATION: Normal.

BONES: No acute fracture or dislocation.  Mild tricompartmental arthrosis.

JOINTS: Small effusion.

SOFT TISSUES: No significant soft tissue swelling.  No radiopaque foreign body.

OTHER: No other significant finding.



IMPRESSION:  NO FRACTURE.  Small joint effusion.



TECHNICAL DOCUMENTATION:  JOB ID:  [id number]

[id number]

 2011 [employer]- All Rights Reserved



Reading location - IP/workstation name: [email or web address]

## 2020-10-11 NOTE — ER DOCUMENT REPORT
ED Medical Screen (RME)





- General


Chief Complaint: Leg Pain


Stated Complaint: LEG PAIN


Time Seen by Provider: 10/11/20 13:09


Primary Care Provider: 


DEWEY CLEMENTE MD [Primary Care Provider] - Follow up as needed


Mode of Arrival: Ambulatory


Information source: Patient


Notes: 





HPI;-55 year-old female presents emergency room complaining of left leg pain for

the past 2 days.  States she woke up with left ankle pain 2 days ago and now the

pain has progressed all the way up her knee and thigh.  She denies any trauma or

injury.  States she is concerned about "DVT".  She denies any history of 

previous DVTs.  No recent travel.  No shortness of breath, no difficulty 

breathing.  Has not been taking anything for her pain.  She is also complaining 

of some vaginal discharge for the past 2 weeks.  Denies any urinary symptoms.  

Currently goes to the methadone clinic for previous history of cocaine and 

heroin abuse.





PE: Alert and oriented x3.  Mild distress noted.  Lungs: Clear to auscultation 

without rales, rhonchi, wheezes.  Heart: Regular rate rhythm without murmurs, 

rubs, gallops.  Tenderness and swelling on palpation over the left patella.  

Painful range of motion with flexion extension of the left knee.  Positive 

Homans on the left.  Positive left pedal pulse.








I have greeted and performed a rapid initial assessment of this patient.  A 

comprehensive ED assessment and evaluation of the patient, analysis of test 

results and completion of the medical decision making process will be conducted 

by additional ED providers.  I have specifically instructed the patient or 

family members with the patient to immediately return to any nursing staff 

should anything change in the patient's condition or with their chief complaint.


TRAVEL OUTSIDE OF THE [place] IN LAST 30 DAYS: Yes





- Related Data


Allergies/Adverse Reactions: 


                                        





Shellfish * [Shellfish] Allergy (Severe, Verified 06/14/20 20:55)


   Anaphylaxis


iodine Allergy (Verified 06/14/20 20:55)


   








Home Medications: methadone





Past Medical History





- Social History


Chew tobacco use (# tins/day): No


Frequency of alcohol use: None


Drug Abuse: Cocaine, Heroin





- Past Medical History


Cardiac Medical History: Reports: Hx Hypertension


Pulmonary Medical History: Reports: Hx Asthma - Remote history, Hx COPD, Hx 

Pneumonia


Neurological Medical History: Reports: Hx Seizures.  Denies: Hx Cerebrovascular 

Accident


Endocrine Medical History: Reports: Hx Hypothyroidism.  Denies: Hx Graves' 

Disease, Hx Hyperthyroidism


Renal/ Medical History: Denies: Hx Peritoneal Dialysis


Malignancy Medical History: Reports: Hx Cervical Cancer


GI Medical History: Reports: Hx Gastroesophageal Reflux Disease, Hx Irritable 

Bowel, Hx Colonoscopy, Hx Endoscopy


Musculoskeltal Medical History: Reports Hx Arthritis - CERVICAL/BACK DDD, 

Reports Hx Fibromyalgia, Reports Hx Musculoskeletal Deformity, Reports Hx 

Musculoskeletal Trauma


Skin Medical History: Reports Hx Cellulitis, Reports Hx MRSA


Psychiatric Medical History: Reports: Hx Anxiety, Hx Bipolar Disorder, Hx 

Depression, Hx Obsessive Compulsive Disorder


Traumatic Medical History: Reports: Hx Fractures - Foot nose ribs


Infectious Medical History: Reports: Hx MRSA


Past Surgical History: Reports: Hx Appendectomy, Hx Bowel Surgery - RESECTION, 

Hx Hysterectomy, Hx Orthopedic Surgery - Surgery on left foot 2, Hx Tubal 

Ligation





- Immunizations


Immunizations up to date: Yes


Hx Diphtheria, Pertussis, Tetanus Vaccination: No - THINKS SHE HAD IN 2006.





Physical Exam





- Vital signs


Vitals: 





                                        











Temp Pulse Resp BP Pulse Ox


 


 97.3 F   86   20   112/71   96 


 


 10/11/20 12:35  10/11/20 12:35  10/11/20 12:35  10/11/20 12:35  10/11/20 12:35














Course





- Vital Signs


Vital signs: 





                                        











Temp Pulse Resp BP Pulse Ox


 


 97.3 F   86   20   112/71   96 


 


 10/11/20 13:10  10/11/20 12:35  10/11/20 12:35  10/11/20 12:35  10/11/20 12:35














Doctor's Discharge





- Discharge


Referrals: 


DEWEY CLEMENTE MD [Primary Care Provider] - Follow up as needed

## 2020-10-11 NOTE — RADIOLOGY REPORT (SQ)
EXAM DESCRIPTION:  VENOUS UNILATERAL LOWER



IMAGES COMPLETED DATE/TIME:  10/11/2020 2:48 pm



REASON FOR STUDY:  left leg pain/swelling



COMPARISON:  None.



TECHNIQUE:  Dynamic and static gray scale and color images acquired of the left leg venous system. Se
lected spectral images acquired with additional compression and augmentation maneuvers. The contralat
eral common femoral vein and saphenofemoral junction were also imaged. Images stored on PACS.



LIMITATIONS:  None.



FINDINGS:  COMMON FEMORAL: Normal phasicity, compression and augmentation. No visualized echogenic ma
terial on gray scale. No defects on color images.

FEMORAL: Normal compression and augmentation. No visualized echogenic material on gray scale. No defe
cts on color images.

POPLITEAL: Normal compression, augmentation. No visualized echogenic material on gray scale. No defec
ts on color images.

CALF VESSELS: Normal compression, augmentation. No visualized echogenic material on gray scale. No de
fects on color images.

GSV and SSV: Normal compression, augmentation. No visualized echogenic material on gray scale. No def
ects on color images.

ANY DEEP VENOUS INSUFFICIENCY: Not evaluated.

ANY EVIDENCE OF POPLITEAL CYST: No.

OTHER: No other significant finding.

CONTRALATERAL COMMON FEMORAL VEIN AND SAPHENOFEMORAL JUNCTION:

Normal phasicity, compression and augmentation. No visualized echogenic material on gray scale. No de
fects on color images.



IMPRESSION:  NO EVIDENCE OF DVT OR SVT IN THE LEFT LEG.



TECHNICAL DOCUMENTATION:  JOB ID:  [id number]

[id number]

 2011 [employer]- All Rights Reserved



Reading location - IP/workstation name: [email or web address]